# Patient Record
Sex: MALE | Race: WHITE
[De-identification: names, ages, dates, MRNs, and addresses within clinical notes are randomized per-mention and may not be internally consistent; named-entity substitution may affect disease eponyms.]

---

## 2017-11-03 ENCOUNTER — HOSPITAL ENCOUNTER (OUTPATIENT)
Dept: HOSPITAL 92 - CT | Age: 77
Discharge: HOME | End: 2017-11-03
Attending: INTERNAL MEDICINE
Payer: MEDICARE

## 2017-11-03 DIAGNOSIS — C18.0: ICD-10-CM

## 2017-11-03 DIAGNOSIS — C34.90: Primary | ICD-10-CM

## 2017-11-03 PROCEDURE — 71260 CT THORAX DX C+: CPT

## 2017-11-03 PROCEDURE — 74177 CT ABD & PELVIS W/CONTRAST: CPT

## 2017-11-03 NOTE — CT
CT THORAX WITH IV CONTRAST:

 

CT ABDOMEN AND PELVIS WITH IV CONTRAST:

 

11/03/2017

 

HISTORY:

Follow up lung cancer as well as colon cancer.  CEA numbers have increased.  History of left lower l
milo removal, as well as colon resection.

 

COMPARISON:

PET CT on 02/18/2017 and CT thorax on 02/03/2017 and CT abdomen and pelvis on 01/30/2017.

 

FINDINGS:

THORAX:  Post surgical changes related to median sternotomy and CABG are again noted.  Post surgical
 changes related to a left lower lobectomy are noted with surgical changes in the left hilar region,
 with post surgical changes involving the left sided ribs.

 

Previously noted left lower lobe mass is not seen related to left lobectomy.  There has been interva
l development of multiple bilateral pulmonary nodules, with the largest pulmonary nodule in the supe
rior segment of the right lower lobe, measuring 9 mm.  The largest pulmonary nodule seen in the left
 lower lobe measures 11 mm.

 

There has been interval development of an enlarged subcarinal lymph node, which measures 5.1 cm cran
iocaudal x 2.4 cm transverse x 1.4 cm AP.  No additional enlarged mediastinal, axillary, or hilar ly
mph nodes are seen.

 

There are mild emphysematous changes in the upper lobes, greater on the right.

 

ABDOMEN AND PELVIS:  Again noted is cholelithiasis.

 

Subcentimeter, [id number], hypodense lesion is seen in the right hepatic lobe, stable
 from the prior exam.  Low density area seen within the more posterior aspect of the medial segment,
 left hepatic lobe, which is probably volume averaging, although a subtle subcentimeter hypodense le
isaac cannot be entirely excluded.

 

There is a lobulated appearance of each kidney, but the kidneys otherwise have a normal and stable a
ppearance from prior exam.

 

The spleen, pancreas, bilateral adrenal glands, opacified bowel, and urinary bladder demonstrate a n
ormal CT appearance.

 

Again noted is evidence of post colon resection.  Anastomosis is seen in the region of the rectosigm
oid junction with small bowel colonic anastomosis in the right upper quadrant.

 

There is mild focal aneurysmal dilatation of the inferior abdominal aorta, measuring 3.1 cm, with de
nse vascular calcifications present.

 

The prostate gland remains enlarged and heterogeneous in appearance.

 

There are fat containing bilateral inguinal hernias again identified.

 

Distal to the anastomotic suture line, within the rectosigmoid region, there is suggested eccentric 
thickening at the left aspect of the rectum.  A developing neoplastic process in this region cannot 
be entirely excluded, given asymmetry, compared to the remainder of the wall of the remaining colon.
  A few scattered colonic diverticula are seen.

 

IMPRESSION:

1.  Mild eccentric thickening involving the left aspect of the rectum, below the anastomotic suture 
line.  A developing neoplastic process in this region cannot be excluded.  Direct visualization is s
uggested.

 

2.  Metastatic disease with interval development of multiple bilateral pulmonary nodules, as well as
 an enlarged subcarinal lymph node.

 

3.  Post surgical changes related to left lower lobectomy, as well as post surgical changes related 
to coronary artery bypass grafting and partial colon resection.

 

4.  Additional incidental findings are as described above.

 

POS: [facility]

## 2017-11-21 ENCOUNTER — HOSPITAL ENCOUNTER (OUTPATIENT)
Dept: HOSPITAL 92 - SDC | Age: 77
End: 2017-11-21
Attending: THORACIC SURGERY (CARDIOTHORACIC VASCULAR SURGERY)
Payer: MEDICARE

## 2017-11-21 VITALS — BODY MASS INDEX: 29.1 KG/M2

## 2017-11-21 DIAGNOSIS — Z87.891: ICD-10-CM

## 2017-11-21 DIAGNOSIS — I10: ICD-10-CM

## 2017-11-21 DIAGNOSIS — J44.9: ICD-10-CM

## 2017-11-21 DIAGNOSIS — Z98.890: ICD-10-CM

## 2017-11-21 DIAGNOSIS — Z90.2: ICD-10-CM

## 2017-11-21 DIAGNOSIS — C38.3: Primary | ICD-10-CM

## 2017-11-21 DIAGNOSIS — E78.5: ICD-10-CM

## 2017-11-21 DIAGNOSIS — Z79.84: ICD-10-CM

## 2017-11-21 DIAGNOSIS — Z90.49: ICD-10-CM

## 2017-11-21 DIAGNOSIS — Z79.82: ICD-10-CM

## 2017-11-21 DIAGNOSIS — Z95.1: ICD-10-CM

## 2017-11-21 DIAGNOSIS — Z91.041: ICD-10-CM

## 2017-11-21 DIAGNOSIS — E11.9: ICD-10-CM

## 2017-11-21 DIAGNOSIS — Z79.899: ICD-10-CM

## 2017-11-21 DIAGNOSIS — Z87.01: ICD-10-CM

## 2017-11-21 LAB
ANION GAP SERPL CALC-SCNC: 13 MMOL/L (ref 10–20)
BASOPHILS # BLD AUTO: 0.1 THOU/UL (ref 0–0.2)
BASOPHILS NFR BLD AUTO: 1.1 % (ref 0–1)
BUN SERPL-MCNC: 17 MG/DL (ref 8.4–25.7)
CALCIUM SERPL-MCNC: 9.1 MG/DL (ref 7.8–10.44)
CHLORIDE SERPL-SCNC: 103 MMOL/L (ref 98–107)
CO2 SERPL-SCNC: 21 MMOL/L (ref 23–31)
CREAT CL PREDICTED SERPL C-G-VRATE: 67 ML/MIN (ref 70–130)
EOSINOPHIL # BLD AUTO: 0.4 THOU/UL (ref 0–0.7)
EOSINOPHIL NFR BLD AUTO: 5.5 % (ref 0–10)
HCT VFR BLD CALC: 41.1 % (ref 42–52)
LYMPHOCYTES # BLD: 1.2 THOU/UL (ref 1.2–3.4)
LYMPHOCYTES NFR BLD AUTO: 18 % (ref 21–51)
MONOCYTES # BLD AUTO: 0.9 THOU/UL (ref 0.11–0.59)
MONOCYTES NFR BLD AUTO: 13.3 % (ref 0–10)
NEUTROPHILS # BLD AUTO: 4.2 THOU/UL (ref 1.4–6.5)
RBC # BLD AUTO: 4.14 MILL/UL (ref 4.7–6.1)
WBC # BLD AUTO: 6.7 THOU/UL (ref 4.8–10.8)

## 2017-11-21 PROCEDURE — 80048 BASIC METABOLIC PNL TOTAL CA: CPT

## 2017-11-21 PROCEDURE — 36415 COLL VENOUS BLD VENIPUNCTURE: CPT

## 2017-11-21 PROCEDURE — 85025 COMPLETE CBC W/AUTO DIFF WBC: CPT

## 2017-11-21 PROCEDURE — 88307 TISSUE EXAM BY PATHOLOGIST: CPT

## 2017-11-21 PROCEDURE — 93005 ELECTROCARDIOGRAM TRACING: CPT

## 2017-11-21 PROCEDURE — 0WBC4ZX EXCISION OF MEDIASTINUM, PERCUTANEOUS ENDOSCOPIC APPROACH, DIAGNOSTIC: ICD-10-PCS | Performed by: THORACIC SURGERY (CARDIOTHORACIC VASCULAR SURGERY)

## 2017-11-21 PROCEDURE — 93010 ELECTROCARDIOGRAM REPORT: CPT

## 2017-11-21 PROCEDURE — 88331 PATH CONSLTJ SURG 1 BLK 1SPC: CPT

## 2017-11-21 NOTE — OP
PREOPERATIVE DIAGNOSIS:  Subcarinal mediastinal adenopathy.

 

POSTOPERATIVE DIAGNOSIS:  Subcarinal mediastinal adenopathy.

 

PROCEDURE:  Cervical mediastinal exploration with biopsies.

 

SURGEON:  Jaspal Reyes M.D.

 

ANESTHESIA:  General.

 

ESTIMATED BLOOD LOSS:  Less than 5 mL.

 

PROCEDURE IN DETAIL:  After adequate anesthesia had been obtained, the patient had a right shoulder r
oll placed and the head was left on a pillow.  He was prepped and draped.  Suprasternal notch incisio
n was then made and carried down through the midline bluntly and sharply to the level of the trachea 
where blunt finger dissection into the mediastinum was carried out.  Mediastinoscope was then inserte
d following which the dissection was carried down to the maddi.  After identifying right and left ma
in stem bronchi, lymph node was identified that was firm.  It was mobilized to some degree with a doc
nt dissection and then after aspirating for blood.  Multiple biopsies were obtained.  Frozen section 
returned to carcinoma.  Hemostasis was obtained and after checking this, the scope was removed and th
e wound was closed in layers.

## 2017-11-26 NOTE — EKG
Test Reason : PREOP

Blood Pressure : ***/*** mmHG

Vent. Rate : 069 BPM     Atrial Rate : 069 BPM

   P-R Int : 184 ms          QRS Dur : 132 ms

    QT Int : 418 ms       P-R-T Axes : 041 -37 112 degrees

   QTc Int : 447 ms

 

Normal sinus rhythm

Left axis deviation

Non-specific intra-ventricular conduction block

T wave abnormality, consider anterolateral ischemia

Abnormal ECG

When compared with ECG of 08-MAR-2017 17:11,

QRS axis Shifted left

Confirmed by MAKAYLA ALLEN ([id number]) on 11/26/2017 8:37:25 AM

 

Referred By:  JOSEMANUEL           Confirmed By:MAKAYLA ALLEN

## 2018-01-08 ENCOUNTER — HOSPITAL ENCOUNTER (EMERGENCY)
Dept: HOSPITAL 92 - SCSER | Age: 78
Discharge: HOME | End: 2018-01-08
Payer: MEDICARE

## 2018-01-08 DIAGNOSIS — E11.9: ICD-10-CM

## 2018-01-08 DIAGNOSIS — Z87.891: ICD-10-CM

## 2018-01-08 DIAGNOSIS — I10: ICD-10-CM

## 2018-01-08 DIAGNOSIS — E87.1: ICD-10-CM

## 2018-01-08 DIAGNOSIS — J44.9: ICD-10-CM

## 2018-01-08 DIAGNOSIS — E78.5: ICD-10-CM

## 2018-01-08 DIAGNOSIS — C34.90: Primary | ICD-10-CM

## 2018-01-08 PROCEDURE — 94640 AIRWAY INHALATION TREATMENT: CPT

## 2018-01-08 PROCEDURE — 93005 ELECTROCARDIOGRAM TRACING: CPT

## 2018-01-08 PROCEDURE — 71046 X-RAY EXAM CHEST 2 VIEWS: CPT

## 2018-01-08 NOTE — RAD
CHEST TWO VIEWS:

 

History: 

77-year-old male with shortness of breath, difficulty breathing. History of COPD. History of colon ca
rcinoma with metastases to both lungs. 

 

Comparison: 

Chest CT, 11-3-17. 

 

FINDINGS: 

Post underlying sternotomy. Numerous bilateral pulmonary metastases have increased in size and number
 from the prior CT of 11-3-17. Bilateral pleural effusions, slightly greater on the left side, have d
eveloped. Mild cardiomegaly. Mild bilateral vascular congestion. 

 

IMPRESSION: 

Increase in size and number of multiple bilateral pulmonary metastases. Developing bilateral pleural 
effusions. Developing bilateral vascular congestion with minimal cardiomegaly. 

 

POS: [facility]

## 2018-01-20 ENCOUNTER — HOSPITAL ENCOUNTER (INPATIENT)
Dept: HOSPITAL 92 - SCSER | Age: 78
LOS: 2 days | Discharge: HOME | DRG: 189 | End: 2018-01-22
Attending: INTERNAL MEDICINE | Admitting: INTERNAL MEDICINE
Payer: MEDICARE

## 2018-01-20 VITALS — BODY MASS INDEX: 28.3 KG/M2

## 2018-01-20 DIAGNOSIS — Z79.82: ICD-10-CM

## 2018-01-20 DIAGNOSIS — J98.11: ICD-10-CM

## 2018-01-20 DIAGNOSIS — Z90.49: ICD-10-CM

## 2018-01-20 DIAGNOSIS — J90: ICD-10-CM

## 2018-01-20 DIAGNOSIS — C34.91: ICD-10-CM

## 2018-01-20 DIAGNOSIS — C78.1: ICD-10-CM

## 2018-01-20 DIAGNOSIS — E87.1: ICD-10-CM

## 2018-01-20 DIAGNOSIS — Z91.09: ICD-10-CM

## 2018-01-20 DIAGNOSIS — Z99.81: ICD-10-CM

## 2018-01-20 DIAGNOSIS — I12.9: ICD-10-CM

## 2018-01-20 DIAGNOSIS — I25.2: ICD-10-CM

## 2018-01-20 DIAGNOSIS — D64.9: ICD-10-CM

## 2018-01-20 DIAGNOSIS — C77.8: ICD-10-CM

## 2018-01-20 DIAGNOSIS — C34.92: ICD-10-CM

## 2018-01-20 DIAGNOSIS — E78.5: ICD-10-CM

## 2018-01-20 DIAGNOSIS — E11.9: ICD-10-CM

## 2018-01-20 DIAGNOSIS — I25.10: ICD-10-CM

## 2018-01-20 DIAGNOSIS — Z85.038: ICD-10-CM

## 2018-01-20 DIAGNOSIS — J44.9: ICD-10-CM

## 2018-01-20 DIAGNOSIS — J96.21: Primary | ICD-10-CM

## 2018-01-20 DIAGNOSIS — N18.2: ICD-10-CM

## 2018-01-20 DIAGNOSIS — Z95.1: ICD-10-CM

## 2018-01-20 LAB
ALBUMIN SERPL BCG-MCNC: 4 G/DL (ref 3.4–4.8)
ALP SERPL-CCNC: 63 U/L (ref 40–150)
ALT SERPL W P-5'-P-CCNC: 22 U/L (ref 8–55)
ANION GAP SERPL CALC-SCNC: 15 MMOL/L (ref 10–20)
AST SERPL-CCNC: 14 U/L (ref 5–34)
BASOPHILS # BLD AUTO: 0.1 THOU/UL (ref 0–0.2)
BASOPHILS NFR BLD AUTO: 1.5 % (ref 0–1)
BILIRUB SERPL-MCNC: 0.3 MG/DL (ref 0.2–1.2)
BUN SERPL-MCNC: 14 MG/DL (ref 8.4–25.7)
CALCIUM SERPL-MCNC: 9.5 MG/DL (ref 7.8–10.44)
CHLORIDE SERPL-SCNC: 94 MMOL/L (ref 98–107)
CK MB SERPL-MCNC: 1.3 NG/ML (ref 0–6.6)
CK SERPL-CCNC: 52 U/L (ref 30–200)
CO2 SERPL-SCNC: 26 MMOL/L (ref 23–31)
CREAT CL PREDICTED SERPL C-G-VRATE: 0 ML/MIN (ref 70–130)
EOSINOPHIL # BLD AUTO: 0.6 THOU/UL (ref 0–0.7)
EOSINOPHIL NFR BLD AUTO: 6.4 % (ref 0–10)
GLOBULIN SER CALC-MCNC: 2.6 G/DL (ref 2.4–3.5)
GLUCOSE SERPL-MCNC: 169 MG/DL (ref 83–110)
HGB BLD-MCNC: 14.9 G/DL (ref 14–18)
LYMPHOCYTES # BLD: 1 THOU/UL (ref 1.2–3.4)
LYMPHOCYTES NFR BLD AUTO: 11.3 % (ref 21–51)
MCH RBC QN AUTO: 31.5 PG (ref 27–31)
MCV RBC AUTO: 94.4 FL (ref 80–94)
MONOCYTES # BLD AUTO: 1.1 THOU/UL (ref 0.11–0.59)
MONOCYTES NFR BLD AUTO: 11.9 % (ref 0–10)
NEUTROPHILS # BLD AUTO: 6.2 THOU/UL (ref 1.4–6.5)
NEUTROPHILS NFR BLD AUTO: 68.8 % (ref 42–75)
PLATELET # BLD AUTO: 310 THOU/UL (ref 130–400)
POTASSIUM SERPL-SCNC: 4.6 MMOL/L (ref 3.5–5.1)
RBC # BLD AUTO: 4.72 MILL/UL (ref 4.7–6.1)
SODIUM SERPL-SCNC: 130 MMOL/L (ref 136–145)
TROPONIN I SERPL DL<=0.01 NG/ML-MCNC: (no result) NG/ML (ref ?–0.03)
WBC # BLD AUTO: 9 THOU/UL (ref 4.8–10.8)

## 2018-01-20 PROCEDURE — 71045 X-RAY EXAM CHEST 1 VIEW: CPT

## 2018-01-20 PROCEDURE — 71275 CT ANGIOGRAPHY CHEST: CPT

## 2018-01-20 PROCEDURE — 96374 THER/PROPH/DIAG INJ IV PUSH: CPT

## 2018-01-20 PROCEDURE — 82553 CREATINE MB FRACTION: CPT

## 2018-01-20 PROCEDURE — 80069 RENAL FUNCTION PANEL: CPT

## 2018-01-20 PROCEDURE — 80053 COMPREHEN METABOLIC PANEL: CPT

## 2018-01-20 PROCEDURE — 83735 ASSAY OF MAGNESIUM: CPT

## 2018-01-20 PROCEDURE — 94640 AIRWAY INHALATION TREATMENT: CPT

## 2018-01-20 PROCEDURE — 93005 ELECTROCARDIOGRAM TRACING: CPT

## 2018-01-20 PROCEDURE — 36416 COLLJ CAPILLARY BLOOD SPEC: CPT

## 2018-01-20 PROCEDURE — 85025 COMPLETE CBC W/AUTO DIFF WBC: CPT

## 2018-01-20 PROCEDURE — A4216 STERILE WATER/SALINE, 10 ML: HCPCS

## 2018-01-20 PROCEDURE — 84484 ASSAY OF TROPONIN QUANT: CPT

## 2018-01-20 PROCEDURE — 36415 COLL VENOUS BLD VENIPUNCTURE: CPT

## 2018-01-20 NOTE — RAD
PORTABLE CHEST:

1/20/18

 

HISTORY: 

Shortness of breath. 

 

Exam is suboptimal due to poor positioning and motion artifact. 

 

COMPARISON:  

Comparison made to chest film of 1/8/18.

 

There is almost complete opacification of the left hemithorax seen on the current study which is a ne
w finding when compared to the prior exam. There is cardiomegaly with vascular congestion and probabl
y some interstitial edema in the right lung. Postop sternotomy change.

 

IMPRESSION:  

Limited exam due to poor positioning and motion artifact. There is now new opacification in the left 
hemithorax when compared to the prior study. There are numerous nodular densities in the right lung w
hich were described previously as probable metastatic lesions. Congestive changes are again noted. 

 

POS: [facility]

## 2018-01-21 LAB
ALBUMIN SERPL BCG-MCNC: 3.7 G/DL (ref 3.4–4.8)
ANION GAP SERPL CALC-SCNC: 15 MMOL/L (ref 10–20)
BASOPHILS # BLD AUTO: 0.1 THOU/UL (ref 0–0.2)
BASOPHILS NFR BLD AUTO: 0.6 % (ref 0–1)
BUN SERPL-MCNC: 12 MG/DL (ref 8.4–25.7)
BUN/CREAT SERPL: 13.79
CALCIUM SERPL-MCNC: 8.9 MG/DL (ref 7.8–10.44)
CHLORIDE SERPL-SCNC: 96 MMOL/L (ref 98–107)
CO2 SERPL-SCNC: 23 MMOL/L (ref 23–31)
CREAT CL PREDICTED SERPL C-G-VRATE: 81 ML/MIN (ref 70–130)
EOSINOPHIL # BLD AUTO: 0.6 THOU/UL (ref 0–0.7)
EOSINOPHIL NFR BLD AUTO: 5.8 % (ref 0–10)
GLUCOSE SERPL-MCNC: 156 MG/DL (ref 83–110)
HGB BLD-MCNC: 13.9 G/DL (ref 14–18)
LYMPHOCYTES # BLD: 0.9 THOU/UL (ref 1.2–3.4)
LYMPHOCYTES NFR BLD AUTO: 9.3 % (ref 21–51)
MAGNESIUM SERPL-MCNC: 1.8 MG/DL (ref 1.6–2.6)
MCH RBC QN AUTO: 32.7 PG (ref 27–31)
MCV RBC AUTO: 98.7 FL (ref 80–94)
MONOCYTES # BLD AUTO: 1.2 THOU/UL (ref 0.11–0.59)
MONOCYTES NFR BLD AUTO: 11.6 % (ref 0–10)
NEUTROPHILS # BLD AUTO: 7.4 THOU/UL (ref 1.4–6.5)
NEUTROPHILS NFR BLD AUTO: 72.7 % (ref 42–75)
PLATELET # BLD AUTO: 323 THOU/UL (ref 130–400)
POTASSIUM SERPL-SCNC: 3.7 MMOL/L (ref 3.5–5.1)
RBC # BLD AUTO: 4.26 MILL/UL (ref 4.7–6.1)
SODIUM SERPL-SCNC: 130 MMOL/L (ref 136–145)
TROPONIN I SERPL DL<=0.01 NG/ML-MCNC: (no result) NG/ML (ref ?–0.03)
TROPONIN I SERPL DL<=0.01 NG/ML-MCNC: 0.01 NG/ML (ref ?–0.03)
WBC # BLD AUTO: 10.1 THOU/UL (ref 4.8–10.8)

## 2018-01-21 RX ADMIN — THERA TABS SCH TAB: TAB at 08:36

## 2018-01-21 RX ADMIN — INSULIN HUMAN PRN UNIT: 100 INJECTION, SOLUTION PARENTERAL at 06:17

## 2018-01-21 NOTE — HP
DATE OF ADMISSION:  01/20/2018

 

The patient was seen and examined on 01/21/2018 around 01:00 a.m.  The patient got admitted from [facility] ER.

 

CODE STATUS:  FULL CODE, confirmed with the patient.

 

SURROGATE DECISION-MAKER:  Patient makes his own decisions with the help of his wife.

 

CHIEF COMPLAINT:  Shortness of breath of 2 weeks' duration.

 

HISTORY OF PRESENT ILLNESS:  The patient is a 77-year-old male with adenocarcinoma of the lung; coron
alisha artery disease; diabetes mellitus, type 2; COPD with chronic respiratory failure, on home oxygen;
 presented to the emergency room at [place] with above complaints.

 

The patient was seen in the emergency room on 01/18/2018 for similar complaint.  A chest x-ray at [facility]
t time showed multiple bilateral pulmonary metastases with developing bilateral pleural effusion.  He
 was discharged home at that time.

 

The patient returned to the emergency room today due to worsening shortness of breath that got worse 
over the last 2-3 days to the extent that he was short of breath at rest.  He is normally on 3 liters
 oxygen.  He tried increasing the oxygen to 5 liters without significant help.  He also uses nebulize
r treatment every 4 hours.  He had some cough, which was essentially nonproductive.  The shortness of
 breath was also worse on lying down.  He was placed on nonrebreather in the emergency room.  His O2 
saturation in the ER was 76% on 4 liter nasal cannula.  Chest x-ray done in the emergency room today 
was consistent with new opacification of the left hemithorax compared to the study from 12 days ago. 
 He received Lasix 40 mg along with DuoNeb in the emergency room.

 

PAST MEDICAL HISTORY:

1.  Non-small cell lung cancer.

2.  Chronic obstructive pulmonary disease.

3.  Chronic respiratory failure, on home oxygen 3-3.5 liters.

4.  Hypertension.

5.  Diabetes mellitus, type 2.

6.  Colon cancer, status post colectomy.

7.  Hypertension.

8.  Dyslipidemia.

 

PAST SURGICAL HISTORY:

1.  CABG in 1993 with redo CABG in 2003.

2.  Appendectomy.

3.  Herniography.

4.  Cardiac catheterization.

5.  Left inguinal hernia repair.

6.  Laparoscopic partial colectomy in 01/2017.

7.  Left lower lobectomy in 03/2017..

8.  Mediastinal exploration with biopsy in 11/2017.

 

ALLERGIES:  The patient is allergic to IODINE.

 

CURRENT HOME MEDICATIONS:  Amlodipine 2.5 mg daily, aspirin 81 mg daily, carvedilol 12.5 mg b.i.d., Z
yrtec 10 mg daily, vitamin D3 2000 units daily, glucosamine 1 capsule daily, lisinopril 5 mg at bedti
me, metformin 500 mg b.i.d., multivitamin 1 tablet daily, Actos 30 mg daily, simvastatin 10 mg at bed
time, Incruse Ellipta 1 inhalation daily.

 

SOCIAL HISTORY:  Patient currently lives at home, is a former smoker.  No alcohol or drug use.

 

FAMILY HISTORY:  Negative for premature coronary artery disease.

 

REVIEW OF SYSTEMS:  The following complete review of systems was negative, unless otherwise mentioned
 in the HPI or below:  Constitutional:  Weight loss or gain, ability to conduct usual activities.  Sk
in:  Rash, itching.  Eyes:  Double vision, pain.  ENT/Mouth:  Nose bleeding, neck stiffness, pain, te
nderness.  Cardiovascular:  Palpitations, dyspnea on exertion, orthopnea.  Respiratory:  Shortness of
 breath, wheezing, cough, hemoptysis, fever, or night sweats.  Gastrointestinal:  Poor appetite, abdo
jose pain, heartburn, nausea, vomiting, constipation, or diarrhea.  Genitourinary:  Urgency, frequen
cy, dysuria, nocturia.  Musculoskeletal:  Pain, swelling.  Neurologic/Psychiatric:  Anxiety, depressi
on.  Allergy/Immunologic:  Skin rash, bleeding tendency.

 

PHYSICAL EXAMINATION:

VITAL SIGNS:  As discussed above.  His temperature was 97.8, respirations 24, pulse rate of 80 with a
 blood pressure 157/84.

GENERAL:  A 77-year-old male in mild respiratory distress, able to complete short sentences.  Overall
, feels better after ER treatment.

HEENT:  Head atraumatic, normocephalic.  Sclerae are anicteric.  Moist mucous membranes.  No oral les
ion.

NECK:  Supple, no JVD appreciated.  No carotid bruit.

LUNGS:  Showed decreased breath sounds on the left with scattered rhonchi mainly on the right.  No si
gnificant wheezing noted.  There was decreased chest movement on the left.  They were done notes on p
ercussion on the left.

HEART:  S1, S2 present.  Regular rate and rhythm.  Healed midline scar from previous CABG.  No heaves
 or pulsation.

ABDOMEN:  Soft, nontender, bowel sounds present.

EXTREMITIES:  No edema or calf tenderness.

NEUROLOGIC:  Grossly nonfocal, moves all 4 extremities.

PSYCHIATRY:  Alert, awake, oriented x3.

SKIN:  Warm and dry.

LYMPH NODES:  No palpable lymph nodes in the neck.

PERIPHERAL VASCULAR:  Radial pulses palpable bilaterally.

MUSCULOSKELETAL:  No joint swelling or tenderness.

 

LABORATORY FINDINGS:  CBC showed WBC 9.0 with hemoglobin 14.9, hematocrit 44.5, platelet 310.  Chemis
tries showed sodium 130, potassium 4.6, chloride 94, bicarbonate 26, BUN 14, creatinine 1.12, glucose
 of 169.  Chest x-ray, by my review, as discussed above.  EKG, by my review, showed sinus rhythm with
 left axis deviation with some nonspecific ST-T wave changes.

 

IMPRESSION AND PLAN:

1.  Acute on chronic hypoxic respiratory failure secondary to left-sided pleural effusion.

2.  History of lung adenocarcinoma, followed by Dr. Power.

3.  Coronary artery disease, status post myocardial infarction and coronary artery bypass grafting in
 the past.

4.  Hypertension.

5.  Diabetes mellitus, type 2.

6.  Chronic obstructive pulmonary disease.

7.  Chronic respiratory failure, on 3-3.5 liters oxygen.

8.  Chronic anemia.

9.  History of colon cancer, status post resection.

10.  Hyponatremia.

11.  Chronic kidney disease, stage 2.

 

PLAN:  The patient will be monitored in the intermediate care unit.  We will continue nonrebreather f
or now.  The patient may need intermittent noninvasive positive pressure ventilation.  We will consul
t Dr. Desai in a.m.  He will probably get thoracentesis.  Pleural effusion appears to be malignant.  
We will hold aspirin for now.  Insulin sliding scale will be started.  Selected home medications will
 be resumed.  We will hold diuretics for now.  He received a dose of Lasix in the emergency room.  Co
ntinue carvedilol.  Plan of care was discussed with the patient.  He stated understanding.  The patie
nt will require 2-3 days for stabilization.

## 2018-01-22 VITALS — DIASTOLIC BLOOD PRESSURE: 81 MMHG | SYSTOLIC BLOOD PRESSURE: 133 MMHG | TEMPERATURE: 97.2 F

## 2018-01-22 LAB
ALBUMIN SERPL BCG-MCNC: 4.2 G/DL (ref 3.4–4.8)
ANION GAP SERPL CALC-SCNC: 15 MMOL/L (ref 10–20)
BASOPHILS # BLD AUTO: 0.1 THOU/UL (ref 0–0.2)
BASOPHILS NFR BLD AUTO: 0.6 % (ref 0–1)
BUN SERPL-MCNC: 14 MG/DL (ref 8.4–25.7)
BUN/CREAT SERPL: 16.87
CALCIUM SERPL-MCNC: 9.7 MG/DL (ref 7.8–10.44)
CHLORIDE SERPL-SCNC: 96 MMOL/L (ref 98–107)
CO2 SERPL-SCNC: 23 MMOL/L (ref 23–31)
CREAT CL PREDICTED SERPL C-G-VRATE: 81 ML/MIN (ref 70–130)
EOSINOPHIL # BLD AUTO: 0 THOU/UL (ref 0–0.7)
EOSINOPHIL NFR BLD AUTO: 0.4 % (ref 0–10)
GLUCOSE SERPL-MCNC: 181 MG/DL (ref 83–110)
HGB BLD-MCNC: 14.9 G/DL (ref 14–18)
LYMPHOCYTES # BLD: 0.6 THOU/UL (ref 1.2–3.4)
LYMPHOCYTES NFR BLD AUTO: 7 % (ref 21–51)
MCH RBC QN AUTO: 33.8 PG (ref 27–31)
MCV RBC AUTO: 99.9 FL (ref 80–94)
MONOCYTES # BLD AUTO: 0.2 THOU/UL (ref 0.11–0.59)
MONOCYTES NFR BLD AUTO: 2 % (ref 0–10)
NEUTROPHILS # BLD AUTO: 7.4 THOU/UL (ref 1.4–6.5)
NEUTROPHILS NFR BLD AUTO: 89.9 % (ref 42–75)
PLATELET # BLD AUTO: 343 THOU/UL (ref 130–400)
POTASSIUM SERPL-SCNC: 4.4 MMOL/L (ref 3.5–5.1)
RBC # BLD AUTO: 4.4 MILL/UL (ref 4.7–6.1)
SODIUM SERPL-SCNC: 130 MMOL/L (ref 136–145)
WBC # BLD AUTO: 8.2 THOU/UL (ref 4.8–10.8)

## 2018-01-22 RX ADMIN — THERA TABS SCH TAB: TAB at 07:58

## 2018-01-22 RX ADMIN — INSULIN HUMAN PRN UNIT: 100 INJECTION, SOLUTION PARENTERAL at 06:31

## 2018-01-22 RX ADMIN — INSULIN HUMAN PRN UNIT: 100 INJECTION, SOLUTION PARENTERAL at 10:58

## 2018-01-22 NOTE — CON
DATE OF CONSULTATION:  01/21/2018

 

SUBJECTIVE:  Mr. Santana is a pleasant 77-year-old male.

 

I met with the wife and asked to get history from her.  He was recently in the hospital for evaluatio
n in the emergency department with complaints of shortness of breath.  He was sent home and has had p
rogressive decline in his dyspnea.

 

He has been seen by me in the past after a lung mass was found during workup for colon cancer.  Lung 
mass was resected after colon cancer was resected.

 

He subsequently presented with mediastinal metastasis and worked up with mediastinal.

 

It was felt to have multiple pulmonary nodules in both lungs that were all malignant.

 

Last chest radiograph showed this.  He presented this admission with dyspnea and hypoxemia.  Chest ra
diograph shows what appears to be atelectasis of the left lung, although it is difficult to say wheth
er this is atelectasis or an effusion.  His trachea is pulled to the left, I am guessing it is atelec
tasis.

 

It would be very unusual for a massive effusion reaccumulate in less than 2 weeks.

 

PAST MEDICAL HISTORY:  Remarkable for hypertension, diabetes, lipid disorder, coronary artery bypass 
grafting in 1993 and 2003, appendectomy, herniorrhaphy, colectomy in 01/17, left lower lobectomy in 0
3/2017, this was a T2 M0 tumor, this is a a 6 cm tumor, but was noted negative.  On 11/2017, he had a
 CME.

 

He reports allergy to IODINE.

 

He was on amlodipine, aspirin, Coreg, Zyrtec, glucosamine, vitamin D, lisinopril, metformin, Actos, s
imvastatin, Incruse.

 

SOCIAL HISTORY:  He is a former smoker, does not drink, does not use drugs.

 

FAMILY HISTORY:  Negative for lung disease at an early age.

 

REVIEW OF SYSTEMS:  Remarkable only for shortness of breath on exertion and a cough.  He has had no f
ever, chills, sweats.  He denies hemoptysis.

 

PHYSICAL EXAMINATION:

VITAL SIGNS:  He is afebrile, heart rate is 92, blood pressure 129/81, respiratory rate teens to low 
20s, oximetry 100% on 3 liters.

HEENT:  Pupils are equal.  Sclerae is anicteric.

NECK:  Supple.

LUNGS:  Remarkable for distant breath sounds on the left.

HEART:  Regular rhythm.  S1 and S2 are normal.

ABDOMEN:  Soft and nontender.

EXTREMITIES:  Without asymmetry.

 

LABORATORY DATA:  White count 10.1, hemoglobin 13.9, platelets 323.  Sodium 130, potassium 3.7, chlor
cesar 96, bicarbonate 23, BUN 12, creatinine 0.87.

 

IMPRESSION:

1.  Metastatic lung cancer.

2.  T4M0 colon cancer, resected.

3.  ?Atelectasis of left lung.  CT imaging after he gets prophylaxis for an IODINE allergy, will be t
he next step, workup will be entertained.  He tells me there is no treatment plan.  He has been unabl
e to get approval for Keytruda.  I would be happy to see him while he is in the hospital and evaluate
 him for endobronchial obstruction.  I doubt this is a massive effusion.  If it is, he will need obvi
ously thoracentesis and perhaps even a pleural drainage catheter.

 

This is a 50 minute consult, greater than 50% of the time was spent on the unit coordinating care, re
viewing records from radiographs and labs.

## 2018-01-22 NOTE — PRG
DATE OF SERVICE:  01/22/2018

 

Chai Santana CT was reviewed.  By this morning his atelectasis of his left lung had resolved.  Mu
ltiple pulmonary nodules and small bilateral effusions were noted.

 

He says he feels 100% better.  He is stable for him to go home with nebulized ipratropium and albuter
ol 4 times a day, prednisone 40 mg for 4 days, 20 for 6 days, and 10 mg for 10 days, p.o. antibiotics
, Ceftin 250 mg twice a day, Restoril 15mg for sleep since he cannot sleep when he is on steroids.  

 

I will see him in the office in 1 week.

## 2018-01-22 NOTE — CT
PRELIMINARY REPORT/[facility]/EMERGENCY AFTER

HOURS PROCEDURE:

 

EXAM:

CT Angiography Chest With Intravenous Contrast

 

EXAM DATE/TIME:

Exam ordered 1/22/2018 5:04 AM

 

CLINICAL HISTORY:

77 years old, male; Signs and symptoms; Shortness of breath; Patient HX: SOB, R/O pe

 

TECHNIQUE:

Axial computed tomographic angiography images of the chest with intravenous contrast using pulmonary 
embolism protocol.

 

CONTRAST:

100 mL of ISOVUE administered intravenously.

 

COMPARISON:

No relevant prior studies available.

 

FINDINGS:

Pulmonary arteries: There is no evidence of peripheral filling defects within the pulmonary arterial 
circulation to suggest pulmonary embolism.

Aorta: The aorta is normal. There is no evidence of aortic dissection, leak, rupture, or other compli
cations.

Lungs: There are innumerable pulmonary nodules consistent with lung metastases. Moderate centrilobula
r emphysematous changes are present.

Pleural space: Normal. No significant effusion. No pneumothorax.

Heart: Normal. No cardiomegaly. No significant pericardial effusion. No evidence of RV dysfunction.

Thyroid: The thyroid gland is normal.

Bones/joints: No acute fracture. No dislocation.

Soft tissues: Normal.

Lymph nodes: Pathologically enlarged mediastinal lymph nodes are present. For example there is a 1.4 
cm RIGHT paratracheal lymph node. There is also a 1.7 x 3.2 cm subcarinal/anterior tracheal region po
ssibly representing enlarged lymph node or duplication cyst.

Gallbladder and bile ducts: A calcified gallstone is present.

 

IMPRESSION:

1. There is no CT evidence of acute pulmonary embolism.

2. There are innumerable pulmonary nodules consistent with lung metastases.

3. Mediastinal lymphadenopathy as above.

 

Thank you for allowing us to participate in the care of your patient.

 

Dictated and Authenticated by: Rahul Carmona MD

01/22/2018 5:42 AM Central Time ([place] & [place])

 

 

FINAL REPORT 

EMERGENCY AFTER HOURS CT ANGIO CHEST:

 

Date:  01/22/18 

 

FINDINGS: 

I agree with the preliminary report given by [facility]. No definite central or segmental pulmonary embolus
 is grossly evident. Motion artifact from breathing slightly limits image detail of the segmental pul
monary vasculature of both lower lobes as well as portions of the lingula and right middle lobe. Ther
e are numerous pulmonary nodules seen throughout both lungs that have increased in number from a comp
[facility] consistent with worsening metastatic disease. There is worsening ly
mphadenopathy within the mediastinum and hilar region suspicious for worsening, malignant lymphadenop
athy. There are small bilateral pleural effusions that have developed in the interim. Gallstone is se
en within the upper abdomen within the gallbladder lumen. 

 

IMPRESSION: 

1.  No evidence to suggest central pulmonary embolus. Some limitations to the CT examination of the t
horax as detailed above. 

 

2.  Worsening pulmonary metastatic disease and mediastinal lymphadenopathy. 

 

3.  Cholelithiasis. 

 

4.  New bilateral pleural effusions, left greater than right. 

 

 

POS: [facility]

## 2018-01-23 NOTE — DIS
DATE OF DISCHARGE:  01/22/2018

 

DISCHARGE DISPOSITION:  Home.

 

FOLLOWUP:  Follow up with primary care physician, Dr. Cali Conde in 1 week.  Follow up with Dr. Brian Desai after a week.

 

ALLERGIES:  The patient is allergic to IODINE. 

 

DISCHARGE MEDICATIONS:

1.  Prednisone as directed.

2.  Ceftin 250 mg b.i.d.

3.  DuoNeb as needed.

4.  Restoril as needed.

5.  Other home medications were resumed including amlodipine 2.5 mg daily, aspirin 81 mg daily, carve
dilol 12.5 mg b.i.d., Zyrtec 10 mg daily, vitamin D3 2000 units daily, glucosamine 1 tablet daily, li
sinopril 5 mg at bedtime, Metformin extended release 500 mg b.i.d., multivitamin 1 capsule daily, Act
os 30 mg daily, simvastatin 10 mg at bedtime, Incruse 62.5 mcg inhalation daily.

 

The patient was seen on the day of discharge, denies any new complaints, feels much better.  His oxyg
en saturation is 99% on 3 liter nasal cannula with a blood pressure of 133/81.

 

BRIEF HOSPITAL COURSE:  The patient is a 77-year-old male with adenocarcinoma of the lung, coronary a
rtery disease, diabetes mellitus type 2, COPD with chronic respiratory failure, on home oxygen, prese
nted to the hospital with shortness of breath of 2 weeks' duration.  His chest x-ray on admission sheri
wed left-sided pleural effusion.  Please refer to the history and physical dated 01/18/2018 for furth
er details.

 

The patient was admitted to the Intermediate Care Unit with a diagnosis of respiratory failure.  He w
as started on nebulizer treatment.  The next day the patient was evaluated by Pulmonary, Dr. Desai.  
He was started on steroids with antibiotics.  A CT scan of the chest was done that showed worsening o
f pulmonary metastatic disease with mediastinal lymphadenopathy with new bilateral pleural effusions,
 left greater than right.  His symptoms have significantly improved.  Dr. Desai has cleared the patie
nt for discharge.  He will follow up with Dr. Desai next week.

 

FINAL DIAGNOSES:

1.  Acute on chronic hypoxic respiratory failure secondary to left-sided pleural effusion, improved. 
 There was no need for thoracentesis per Pulmonary.

2.  History of lung adenocarcinoma followed by Dr. Power.

3.  Coronary artery disease, status post myocardial infarction and coronary artery bypass graft in th
e past.

4.  Hypertension.

5.  Diabetes mellitus type 2.

6.  Chronic obstructive pulmonary disease.

7.  Chronic respiratory failure on 3 to 3-1/2 liters of oxygen.

8.  Chronic anemia.

9.  History of colon cancer, status post resection.

10.  Hyponatremia.

11.  Chronic kidney disease stage 2.

 

Plan of care was discussed with the patient in detail.  He stated understanding.

## 2018-01-30 ENCOUNTER — HOSPITAL ENCOUNTER (OUTPATIENT)
Dept: HOSPITAL 92 - RAD | Age: 78
Discharge: HOME | End: 2018-01-30
Attending: INTERNAL MEDICINE
Payer: MEDICARE

## 2018-01-30 DIAGNOSIS — J90: ICD-10-CM

## 2018-01-30 DIAGNOSIS — R06.00: Primary | ICD-10-CM

## 2018-01-30 DIAGNOSIS — R91.8: ICD-10-CM

## 2018-01-30 PROCEDURE — 71046 X-RAY EXAM CHEST 2 VIEWS: CPT

## 2018-01-30 NOTE — RAD
CHEST TWO VIEWS:

 

History: Dyspnea. 

 

Comparison: 1-20-18

 

FINDINGS: 

Cardiac silhouette is enlarged. Pulmonary vasculature remains slightly engorged. Left pleural fluid h
as decreased significantly since the prior study with re-aeration of the left lung. Mediastinum is mi
dline with aortic calcification and post-operative changes. 

 

Patchy areas of infiltrate throughout the right lung have increased slightly. There is a small amount
 of right pleural fluid. 

 

IMPRESSION: 

1. Significant internal decrease left pleural fluid with re-expansion of the left lung. 

2. Patchy infiltrate throughout the right lung has progressed slightly. Small right pleural effusion.


 

[place] [facility]

## 2018-02-07 ENCOUNTER — HOSPITAL ENCOUNTER (INPATIENT)
Dept: HOSPITAL 92 - SCSER | Age: 78
LOS: 7 days | Discharge: HOME | DRG: 180 | End: 2018-02-14
Attending: INTERNAL MEDICINE | Admitting: INTERNAL MEDICINE
Payer: MEDICARE

## 2018-02-07 VITALS — BODY MASS INDEX: 29.8 KG/M2

## 2018-02-07 DIAGNOSIS — I48.0: ICD-10-CM

## 2018-02-07 DIAGNOSIS — Z87.891: ICD-10-CM

## 2018-02-07 DIAGNOSIS — C34.32: Primary | ICD-10-CM

## 2018-02-07 DIAGNOSIS — Z79.899: ICD-10-CM

## 2018-02-07 DIAGNOSIS — E11.22: ICD-10-CM

## 2018-02-07 DIAGNOSIS — Z90.2: ICD-10-CM

## 2018-02-07 DIAGNOSIS — Z95.1: ICD-10-CM

## 2018-02-07 DIAGNOSIS — J96.21: ICD-10-CM

## 2018-02-07 DIAGNOSIS — Z79.82: ICD-10-CM

## 2018-02-07 DIAGNOSIS — N18.3: ICD-10-CM

## 2018-02-07 DIAGNOSIS — J44.9: ICD-10-CM

## 2018-02-07 DIAGNOSIS — Z99.81: ICD-10-CM

## 2018-02-07 DIAGNOSIS — I25.2: ICD-10-CM

## 2018-02-07 DIAGNOSIS — E87.70: ICD-10-CM

## 2018-02-07 DIAGNOSIS — J91.0: ICD-10-CM

## 2018-02-07 DIAGNOSIS — E87.1: ICD-10-CM

## 2018-02-07 DIAGNOSIS — I12.9: ICD-10-CM

## 2018-02-07 DIAGNOSIS — Z85.038: ICD-10-CM

## 2018-02-07 DIAGNOSIS — Z90.49: ICD-10-CM

## 2018-02-07 DIAGNOSIS — E78.5: ICD-10-CM

## 2018-02-07 LAB
ALBUMIN SERPL BCG-MCNC: 3.5 G/DL (ref 3.4–4.8)
ALP SERPL-CCNC: 59 U/L (ref 40–150)
ALT SERPL W P-5'-P-CCNC: 15 U/L (ref 8–55)
ANION GAP SERPL CALC-SCNC: 13 MMOL/L (ref 10–20)
AST SERPL-CCNC: 10 U/L (ref 5–34)
BASOPHILS # BLD AUTO: 0.1 THOU/UL (ref 0–0.2)
BASOPHILS NFR BLD AUTO: 0.6 % (ref 0–1)
BILIRUB SERPL-MCNC: 0.4 MG/DL (ref 0.2–1.2)
BUN SERPL-MCNC: 17 MG/DL (ref 8.4–25.7)
CALCIUM SERPL-MCNC: 8.9 MG/DL (ref 7.8–10.44)
CHLORIDE SERPL-SCNC: 93 MMOL/L (ref 98–107)
CK MB SERPL-MCNC: 1.4 NG/ML (ref 0–6.6)
CO2 SERPL-SCNC: 29 MMOL/L (ref 23–31)
CREAT CL PREDICTED SERPL C-G-VRATE: 0 ML/MIN (ref 70–130)
EOSINOPHIL # BLD AUTO: 0 THOU/UL (ref 0–0.7)
EOSINOPHIL NFR BLD AUTO: 0.4 % (ref 0–10)
GLOBULIN SER CALC-MCNC: 2.5 G/DL (ref 2.4–3.5)
GLUCOSE SERPL-MCNC: 212 MG/DL (ref 83–110)
HGB BLD-MCNC: 14.3 G/DL (ref 14–18)
LYMPHOCYTES # BLD: 0.5 THOU/UL (ref 1.2–3.4)
LYMPHOCYTES NFR BLD AUTO: 5.3 % (ref 21–51)
MCH RBC QN AUTO: 32.7 PG (ref 27–31)
MCV RBC AUTO: 95.3 FL (ref 80–94)
MONOCYTES # BLD AUTO: 0.4 THOU/UL (ref 0.11–0.59)
MONOCYTES NFR BLD AUTO: 4.7 % (ref 0–10)
NEUTROPHILS # BLD AUTO: 8.1 THOU/UL (ref 1.4–6.5)
NEUTROPHILS NFR BLD AUTO: 88.9 % (ref 42–75)
PLATELET # BLD AUTO: 274 THOU/UL (ref 130–400)
POTASSIUM SERPL-SCNC: 4.7 MMOL/L (ref 3.5–5.1)
RBC # BLD AUTO: 4.37 MILL/UL (ref 4.7–6.1)
SODIUM SERPL-SCNC: 130 MMOL/L (ref 136–145)
TROPONIN I SERPL DL<=0.01 NG/ML-MCNC: (no result) NG/ML (ref ?–0.03)
WBC # BLD AUTO: 9.1 THOU/UL (ref 4.8–10.8)

## 2018-02-07 PROCEDURE — 82945 GLUCOSE OTHER FLUID: CPT

## 2018-02-07 PROCEDURE — 93005 ELECTROCARDIOGRAM TRACING: CPT

## 2018-02-07 PROCEDURE — 71046 X-RAY EXAM CHEST 2 VIEWS: CPT

## 2018-02-07 PROCEDURE — 83986 ASSAY PH BODY FLUID NOS: CPT

## 2018-02-07 PROCEDURE — 80053 COMPREHEN METABOLIC PANEL: CPT

## 2018-02-07 PROCEDURE — 84157 ASSAY OF PROTEIN OTHER: CPT

## 2018-02-07 PROCEDURE — 87070 CULTURE OTHR SPECIMN AEROBIC: CPT

## 2018-02-07 PROCEDURE — 83880 ASSAY OF NATRIURETIC PEPTIDE: CPT

## 2018-02-07 PROCEDURE — 88112 CYTOPATH CELL ENHANCE TECH: CPT

## 2018-02-07 PROCEDURE — 85379 FIBRIN DEGRADATION QUANT: CPT

## 2018-02-07 PROCEDURE — 71045 X-RAY EXAM CHEST 1 VIEW: CPT

## 2018-02-07 PROCEDURE — 94640 AIRWAY INHALATION TREATMENT: CPT

## 2018-02-07 PROCEDURE — 88305 TISSUE EXAM BY PATHOLOGIST: CPT

## 2018-02-07 PROCEDURE — C1729 CATH, DRAINAGE: HCPCS

## 2018-02-07 PROCEDURE — 82553 CREATINE MB FRACTION: CPT

## 2018-02-07 PROCEDURE — 85025 COMPLETE CBC W/AUTO DIFF WBC: CPT

## 2018-02-07 PROCEDURE — 84484 ASSAY OF TROPONIN QUANT: CPT

## 2018-02-07 PROCEDURE — 80048 BASIC METABOLIC PNL TOTAL CA: CPT

## 2018-02-07 PROCEDURE — A4216 STERILE WATER/SALINE, 10 ML: HCPCS

## 2018-02-07 PROCEDURE — 94760 N-INVAS EAR/PLS OXIMETRY 1: CPT

## 2018-02-07 PROCEDURE — 36415 COLL VENOUS BLD VENIPUNCTURE: CPT

## 2018-02-07 PROCEDURE — 93970 EXTREMITY STUDY: CPT

## 2018-02-07 PROCEDURE — 87205 SMEAR GRAM STAIN: CPT

## 2018-02-07 PROCEDURE — 36416 COLLJ CAPILLARY BLOOD SPEC: CPT

## 2018-02-07 PROCEDURE — 83615 LACTATE (LD) (LDH) ENZYME: CPT

## 2018-02-07 PROCEDURE — 89051 BODY FLUID CELL COUNT: CPT

## 2018-02-07 PROCEDURE — 87206 SMEAR FLUORESCENT/ACID STAI: CPT

## 2018-02-07 PROCEDURE — 85060 BLOOD SMEAR INTERPRETATION: CPT

## 2018-02-07 PROCEDURE — 87116 MYCOBACTERIA CULTURE: CPT

## 2018-02-07 NOTE — RAD
TWO VIEW CHEST:

2/7/18

 

HISTORY: 

Dyspnea. 

 

Correlation made to recent chest film of 1/30/18 and chest CT of 1/22/18.

 

FINDINGS/IMPRESSION:  

The left pleural effusion has increased since the 1/30/18 exam. There is now moderately large left pl
eural effusion. 

 

There continues to be bilateral patchy alveolar infiltrates. These were present previously but appear
s slightly more extensive today especially in the right mid and lower lung field. 

 

POS: [facility]

## 2018-02-08 LAB
GLUCOSE PLR-MCNC: 268 MG/DL
LDH PLR L TO P-CCNC: 474 U/L
NEUTROPHILS NFR FLD: 22 %
NONHEMATIC CELLS NFR FLD MANUAL: 38 %
PROT PLR-MCNC: 3 G/DL
RBC # FLD AUTO: 1875 /CUMM
RBC BACKGROUND COUNT: 0
TROPONIN I SERPL DL<=0.01 NG/ML-MCNC: (no result) NG/ML (ref ?–0.03)
TROPONIN I SERPL DL<=0.01 NG/ML-MCNC: (no result) NG/ML (ref ?–0.03)
WBC # FLD AUTO: 501 /CUMM
WBC BACKGROUND COUNT: 0

## 2018-02-08 PROCEDURE — 0W9B3ZX DRAINAGE OF LEFT PLEURAL CAVITY, PERCUTANEOUS APPROACH, DIAGNOSTIC: ICD-10-PCS | Performed by: INTERNAL MEDICINE

## 2018-02-08 RX ADMIN — MULTIPLE VITAMINS W/ MINERALS TAB SCH TAB: TAB at 08:53

## 2018-02-08 RX ADMIN — ASPIRIN SCH MG: 81 TABLET ORAL at 08:53

## 2018-02-08 NOTE — CON
DATE OF CONSULTATION:  2018

 

SERVICE:  Pulmonary Medicine.

 

REASON FOR CONSULTATION:  Pleural effusion.

 

HISTORY OF PRESENT ILLNESS:  The patient is a 77-year-old white male with past 
medical history significant for COPD and adenocarcinoma of the lung, which is 
widely metastatic.  He has had a pleural effusion that has been present on a 
couple of imaging studies and progressively getting larger.  The patient also 
notes increasing dyspnea with exertion.  He had episodes of hypoxemia and was 
subsequently brought to the hospital.  He has been given some nebulized 
medications and steroids.  He has had a significant improvement in his 
breathing.  Overall, he denies any fevers, chills, nausea, vomiting or chest 
discomfort.

 

PAST MEDICAL HISTORY:

1.  Adenocarcinoma of the lung, widely metastatic.

2.  Chronic obstructive pulmonary disease.

3.  Chronic hypoxic respiratory failure, requiring 3 liters nasal cannula at 
home.

4.  Hypertension.

5.  Dyslipidemia.

6.  Type 2 diabetes mellitus.

7.  History of colon cancer, status post colectomy.

 

PAST SURGICAL HISTORY:

1.  Coronary artery bypass graft.

2.  Redo coronary artery bypass graft.

3.  Herniorrhaphy.

4.  Appendectomy.

5.  Cardiac catheterization.

6.  Inguinal hernia repair.

7.  Laparoscopic partial colectomy.

8.  Left lower lobectomy.

9.  Mediastinoscopy with biopsy.

 

ALLERGIES:  IODINE.

 

MEDICATIONS:  List of his inpatient medications were reviewed.  No specific 
updates were made.  The patient indicates taking Plavix, but I do not see it on 
the home medication list and is not currently listed as an inpatient 
medication.  Either way, if he took it at home, the last dose was yesterday.

 

SOCIAL HISTORY:  He lives at home with his wife.  He is an avid bowler.  He 
uses his left arm for bowling.  He has a history of smoking, but does not do 
anything currently.  He denies any alcohol or illicit drug use.  He has no 
exposure to chemicals, dust, asbestosis or tuberculosis

 

FAMILY HISTORY:  Noncontributory.

 

REVIEW OF SYSTEMS:  General, head, ears, eyes, nose, throat, cardiovascular, 
respiratory, GI, , musculoskeletal, neurologic and skin is negative except as 
mentioned in the HPI.

 

PHYSICAL EXAMINATION:

VITAL SIGNS:  Afebrile, pulse 92, blood pressure 127/82, respirations 22, 
saturation 92% on 3 liters nasal cannula.

GENERAL:  This patient is awake, alert, in no apparent distress.

LUNGS:  Reduced air entry on the left.  There is a prolonged expiratory phase 
and polyphonic wheezing.  Crackles are evident.

HEART:  Normal rate, regular.

ABDOMEN:  Soft, nontender, nondistended.  Bowel sounds are positive.

MUSCULOSKELETAL:  No cyanosis or clubbing.  No pitting in the bilateral lower 
extremities.

NEUROLOGIC:  Grossly nonfocal.

 

LABORATORY DATA:  WBC 9.1, hemoglobin 14.3, platelets 274,000.  D-dimer 1.59.  
Cardiac enzymes are negative x3.  BNP 83.  Liver function studies and basic 
metabolic profile are unremarkable.

 

IMAGIN.  Ultrasound of the bilateral lower extremities demonstrates no evidence of 
deep vein thrombosis.

2.  Chest x-ray demonstrates interval enlargement in the left side pleural 
effusion.

 

ASSESSMENT:

1.  Acute on chronic hypoxic respiratory failure.  

2.  Adenocarcinoma of the lung, widely metastatic.

3.  Pleural effusion on the left, enlarging.

4.  History of left lower lobectomy.

5.  Chronic obstructive pulmonary disease with acute exacerbation.

 

PLAN:  We will put the patient on very low dose of steroids, and frequent 
nebulized medications.  We will continue our antibiotics.  I am going to do a 
diagnostic and therapeutic thoracentesis.  If this is helpful, and the patient 
has malignant cells in this fluid, the next time he has recurrence of fluid, he 
may be a good candidate for PleurX catheter placement that we may need to take 
care not to put the tube in place that would affect the swing of his left arm 
during bowling if possible as this is one of the things that the patient really 
truly enjoys doing.  Pulmonary Critical Care will continue to follow, but Dr. Desai will assume care in the morning.

 

70 minutes have been devoted to this patient in various activities.  I 
personally reviewed all imaging studies and laboratory data noted within this 
document.  For at least half of this time, I was interacting with the patient 
at the bedside or coordinating care with the care team.  For the remainder of 
the time I was immediately available to the patient in the hospital unit.  



GREGORY

## 2018-02-08 NOTE — ULT
EXAM:

BILATERAL LOWER EXTREMITY VENOUS ULTRASOUND WITH DOPPLER:

 

HISTORY: 

Leg edema.

 

COMPARISON: 

None.

 

TECHNIQUE: 

Gray scale, color flow, Doppler imaging with spectral waveform analysis is performed of the left and 
right lower extremity system.

 

FINDINGS: 

Bilaterally, there is compressibility, presence of flow, and augmentation in the common femoral vein,
 femoral vein, and proximal vein.  There is flow in bilateral greater saphenous veins, profunda veins
, and posterior tibial veins.

 

IMPRESSION: 

No evidence of thrombus of right or left lower extremity venous system.

 

POS: [facility]

## 2018-02-08 NOTE — OP
DATE OF SERVICE:  02/08/2018

 

SERVICE:  Pulmonary Medicine.

 

PROCEDURE:  Left-sided pleural drainage with catheter insertion under ultrasound guidance.

 

CONSENT:  Risks and benefits of this procedure were explained to the patient.  All questions were ans
wered and alternative options explained.

 

STAFF PHYSICIAN:  Saul Manrique M.D.

 

MEDICATIONS USED:  Lidocaine 1% without epinephrine, total quantity 8 mL

 

PREOPERATIVE DIAGNOSES:

1.  Acute hypoxic respiratory failure.

2.  Pleural effusion.

 

POSTPROCEDURE DIAGNOSES:

1.  Acute hypoxic respiratory failure.

2.  Pleural effusion.

 

DESCRIPTION OF PROCEDURE:  A timeout was performed by the procedure team and the patient.  The patien
t was positively identified using name and date of birth.  The procedure site was marked.  Vital sign
 monitoring was accomplished by noninvasive hemodynamic monitoring, pulse oximetry, and telemetry.  I
n the seated position, the left posterior hemithorax was examined using ultrasound probe.  The diaphr
agm and pleural fluid were easily identified.  The skin was prepped and draped in sterile fashion and
 anesthetized with 1% lidocaine without epinephrine.  A finder needle was inserted into the pleural s
pace with return of cloudy straw colored fluid.  The pleural drainage catheter was inserted in the sa
me location and a total quantity of 1800 mL of fluid was withdrawn by syringe pump technique.  [facility].  Evacuation of fluid was terminated because we arrived at -20 cm of pleural
 fluid pressure.  The intact catheter was withdrawn on exhalation and a sterile dressing was applied.
  The patient had stable vital signs throughout the entire procedure.

 

ESTIMATED BLOOD LOSS:  1 mL

 

COMPLICATIONS:  None.

## 2018-02-08 NOTE — HP
DATE OF ADMISSION:  02/07/2018

 

PRIMARY CARE PHYSICIAN:  Dr. Cali Conde.

 

PRIMARY PULMONOLOGIST:  Dr. Desai.

 

CHIEF COMPLAINT:  Shortness of breath.

 

HISTORY OF PRESENT ILLNESS:  Patient is a 77-year-old male with COPD, chronic respiratory failure on 
home oxygen, non-small cell lung cancer who presented to the emergency room with worsening shortness 
of breath over the last 3-4 days duration.

 

Patient was admitted at this facility from 01/20/2018 to 01/22/2018 with shortness of breath.  He was
 discharged home on prednisone taper.

 

Over the last 2-3 weeks, the patient has shortness of breath that is progressively getting worse.  Ov
er the last 2-3 days, the patient is not able to move around due to oxygen desaturation.  He has been
 spending most of his time on the recliner.  He also had some cough productive of thick whitish phleg
m.  He also had wheezing along with some chest tightness.  He denies any orthopnea or paroxysmal noct
urnal dyspnea.  He had some leg swelling, especially towards the end of the day.  He denies any sick 
contacts or fever.

 

In the emergency room, his chest x-ray was consistent with left pleural effusion that has increased s
little 01/30/2018 exam.  He also had some bilateral patchy alveolar infiltrates, which were present pre
viously, but appeared more extensive on the repeat x-ray today.  He received DuoNeb in the emergency 
room.

 

PAST MEDICAL HISTORY:

1.  Non-small cell lung cancer, currently followed by Dr. Power.

2.  Chronic obstructive pulmonary disease.

3.  Chronic respiratory failure, on home oxygen 3-3.5 liters.

4.  Hypertension.

5.  Diabetes mellitus type 2.

6.  Colon cancer, status post colectomy.

7.  Dyslipidemia.

 

PAST SURGICAL HISTORY:

1.  Coronary artery bypass grafting in 1993 with a redo CABG in 2003.

2.  Hernia surgery.

3.  Appendectomy.

4.  Cardiac catheterization.

5.  Inguinal hernia repair.

6.  Laparoscopic partial colectomy.

7.  Left lower lobectomy.

8.  Mediastinal exploration with biopsy.

 

ALLERGIES:  Patient is allergic to IODINE.

 

CURRENT HOME MEDICATIONS:  Per [facility], amlodipine 2.5 mg daily, aspirin 81 mg daily, carvedilol 12.
5 mg b.i.d., Zyrtec 10 mg daily, vitamin D3 2000 units daily, glucosamine 1 capsule daily, DuoNeb as 
needed, lisinopril 5 mg at bedtime, metformin 500 mg b.i.d., multivitamin 1 tablet daily, Actos 30 mg
 daily, prednisone dose unavailable, simvastatin 10 mg at bedtime, Incruse Ellipta 62.5 mcg daily, te
mazepam 15 mg at bedtime p.r.n.

 

SOCIAL HISTORY:  Patient currently lives at home.  He is a former smoker.  Denies any alcohol or drug
 use.

 

FAMILY HISTORY:  Negative for premature coronary artery disease.

 

REVIEW OF SYSTEMS:  The following complete review of systems was negative, unless otherwise mentioned
 in the HPI or below:

Constitutional:  Weight loss or gain, ability to conduct usual activities.

Skin:  Rash, itching.

Eyes:  Double vision, pain.

ENT/Mouth:  Nose bleeding, neck stiffness, pain, tenderness.

Cardiovascular:  Palpitations, dyspnea on exertion, orthopnea.

Respiratory:  Shortness of breath, wheezing, cough, hemoptysis, fever or night sweats.

Gastrointestinal:  Poor appetite, abdominal pain, heartburn, nausea, vomiting, constipation, or diarr
hea.

Genitourinary:  Urgency, frequency, dysuria, nocturia.

Musculoskeletal:  Pain, swelling.

Neurologic/Psychiatric:  Anxiety, depression.

Allergy/Immunologic:  Skin rash, bleeding tendency.

 

PHYSICAL EXAMINATION:

VITAL SIGNS:  As discussed above.

GENERAL:  A 77-year-old male in no apparent distress.  Feels somewhat better after nebulizer treatmen
t.

HEENT:  Head is atraumatic, normocephalic.  Sclerae are anicteric.  Moist mucous membranes.  No oral 
lesion.

NECK:  Supple, no JVD appreciated.  No carotid bruit.

LUNGS:  Showed expiratory wheezing, which is scattered with diminished air entry at bases, left more 
than right.

CARDIOVASCULAR:  Heart S1, S2 present.  Regular rate and rhythm.  No rubs or gallops appreciated.  He
aled midline scar from previous CABG.

ABDOMEN:  Soft, nontender, bowel sounds present.

EXTREMITIES:  Trace edema in bilateral lower extremities.

SKIN:  Warm and dry.

LYMPH NODES:  No palpable lymph nodes in the neck.

PERIPHERAL VASCULAR:  Radial pulses palpable bilaterally.

MUSCULOSKELETAL:  No joint swelling or tenderness.

 

LABORATORY DATA AND X-RAY FINDINGS:

1.  CBC showed WBC of 9.1 with hemoglobin 14.3, hematocrit 41.7, platelet of 274.

2.  D-dimer was 1.59.  Chemistries showed sodium 130, potassium 4.7, chloride 93, bicarbonate 29, BUN
 17, creatinine 0.8.

3.  Troponins were negative.

4.  Chest x-ray by my review as discussed above.  EKG by my review showed sinus rhythm with nonspecif
ic ST-T wave changes.

 

IMPRESSION:

1.  Respiratory distress secondary to chronic obstructive pulmonary disease exacerbation.

2.  Left-sided pleural effusion.

3.  Lung adenocarcinoma followed by Dr. Power.

4.  Coronary artery disease, status post myocardial infarction and coronary artery bypass graft in th
e past.

5.  Diabetes mellitus type 2.

6.  Chronic respiratory failure, on home oxygen.

7.  Hypertension.

8.  Chronic anemia.

9.  History of colon cancer, status post resection.

10.  Chronic hyponatremia.

11.  Chronic kidney disease stage 3.

 

PLAN:  The patient is currently admitted on the telemetry unit.  Home medications will be resumed.  BENITA Desai will be consulted.  Nebulizer treatments.  We will resume home dose of prednisone.  We will 
hold IV steroids for now.  Hold antibiotics for now.

 

Plan of care was discussed with the patient in detail.  He stated understanding.

## 2018-02-08 NOTE — PDOC.EVN
Event Note





- Event Note


Event Note: 





Patient seen and examined. Note dicated. Full code. DPAO - self/family

## 2018-02-09 LAB
ANION GAP SERPL CALC-SCNC: 11 MMOL/L (ref 10–20)
BASOPHILS # BLD AUTO: 0 THOU/UL (ref 0–0.2)
BASOPHILS NFR BLD AUTO: 0.4 % (ref 0–1)
BUN SERPL-MCNC: 13 MG/DL (ref 8.4–25.7)
CALCIUM SERPL-MCNC: 8.9 MG/DL (ref 7.8–10.44)
CHLORIDE SERPL-SCNC: 92 MMOL/L (ref 98–107)
CO2 SERPL-SCNC: 30 MMOL/L (ref 23–31)
CREAT CL PREDICTED SERPL C-G-VRATE: 86 ML/MIN (ref 70–130)
EOSINOPHIL # BLD AUTO: 0.4 THOU/UL (ref 0–0.7)
EOSINOPHIL NFR BLD AUTO: 3.3 % (ref 0–10)
GLUCOSE SERPL-MCNC: 163 MG/DL (ref 83–110)
HGB BLD-MCNC: 14.1 G/DL (ref 14–18)
LYMPHOCYTES # BLD: 0.9 THOU/UL (ref 1.2–3.4)
LYMPHOCYTES NFR BLD AUTO: 7.9 % (ref 21–51)
MCH RBC QN AUTO: 33.7 PG (ref 27–31)
MCV RBC AUTO: 101 FL (ref 80–94)
MONOCYTES # BLD AUTO: 1.3 THOU/UL (ref 0.11–0.59)
MONOCYTES NFR BLD AUTO: 11.7 % (ref 0–10)
NEUTROPHILS # BLD AUTO: 8.5 THOU/UL (ref 1.4–6.5)
NEUTROPHILS NFR BLD AUTO: 76.7 % (ref 42–75)
PLATELET # BLD AUTO: 272 THOU/UL (ref 130–400)
POTASSIUM SERPL-SCNC: 4.4 MMOL/L (ref 3.5–5.1)
RBC # BLD AUTO: 4.19 MILL/UL (ref 4.7–6.1)
SODIUM SERPL-SCNC: 129 MMOL/L (ref 136–145)
WBC # BLD AUTO: 11.1 THOU/UL (ref 4.8–10.8)

## 2018-02-09 RX ADMIN — INSULIN LISPRO PRN UNIT: 100 INJECTION, SOLUTION INTRAVENOUS; SUBCUTANEOUS at 17:08

## 2018-02-09 RX ADMIN — MULTIPLE VITAMINS W/ MINERALS TAB SCH TAB: TAB at 08:11

## 2018-02-09 RX ADMIN — ASPIRIN SCH MG: 81 TABLET ORAL at 08:14

## 2018-02-09 NOTE — PDOC.PN
- Subjective


Encounter Start Date: 02/09/18


Encounter Start Time: 07:00





Pt seen for followup re: pleural effusion.  Reports breathing is better.  had 

episodes of anxiety.  No nausea, vomiting or diarrhea.





- Objective


Resuscitation Status: 


 











Resuscitation Status           FULL:Full Resuscitation














MAR Reviewed: Yes


Vital Signs & Weight: 


 Vital Signs (12 hours)











  Temp Pulse Resp BP BP BP Pulse Ox


 


 02/09/18 11:43  98.2 F  64  18    146/69 H  96


 


 02/09/18 09:26  98.6 F  84  20     92 L


 


 02/09/18 09:25   84  16    


 


 02/09/18 08:22  98.6 F  84  20    149/76 H  92 L


 


 02/09/18 08:11   88   135/72   


 


 02/09/18 08:10     135/72   


 


 02/09/18 08:06   88     135/72 


 


 02/09/18 06:25        91 L


 


 02/09/18 06:24   75  12    


 


 02/09/18 03:59  97.9 F  75  16   126/78   93 L


 


 02/09/18 02:20   78  20     90 L


 


 02/09/18 00:59        93 L








 Weight











Weight                         162 lb 4.8 oz














I&O: 


 











 02/08/18 02/09/18 02/10/18





 06:59 06:59 06:59


 


Intake Total 240  


 


Balance 240  











Result Diagrams: 


 02/09/18 04:37





 02/09/18 04:37





Phys Exam





- Physical Examination


Constitutional: NAD


HEENT: PERRLA, moist MMs, sclera anicteric, oral pharynx no lesions


Neck: supple


Respiratory: no wheezing, no rales, no rhonchi, clear to auscultation bilateral


Diminished air entry L base


Cardiovascular: RRR, no rub


Gastrointestinal: soft, non-tender


Musculoskeletal: edema present


Neurological: moves all 4 limbs


Psychiatric: normal affect, A&O x 3





Dx/Plan


(1) Pleural effusion


Code(s): J90 - PLEURAL EFFUSION, NOT ELSEWHERE CLASSIFIED   Status: Acute   





(2) CAD (coronary artery disease)


Code(s): I25.10 - ATHSCL HEART DISEASE OF NATIVE CORONARY ARTERY W/O ANG PCTRS 

  Status: Chronic   


Qualifiers: 


   Coronary Disease-Associated Artery/Lesion type: native artery   Native vs. 

transplanted heart: native heart   Associated angina: without angina   

Qualified Code(s): I25.10 - Atherosclerotic heart disease of native coronary 

artery without angina pectoris   





(3) Chronic obstructive lung disease


Status: Chronic   





(4) Diabetes mellitus type 2 in nonobese


Code(s): E11.9 - TYPE 2 DIABETES MELLITUS WITHOUT COMPLICATIONS   Status: 

Chronic   





(5) Hypertension


Code(s): I10 - ESSENTIAL (PRIMARY) HYPERTENSION   Status: Chronic   


Qualifiers: 


   Hypertension type: essential hypertension   Qualified Code(s): I10 - 

Essential (primary) hypertension   





(6) Hyponatremia


Code(s): E87.1 - HYPO-OSMOLALITY AND HYPONATREMIA   Status: Chronic   





- Plan


out of bed/ambulate, DVT proph w/SCDs





* .


Continue oxygen PRN, steroids and bronchodilators.


s/p thoracentesis.


Accuchecks, insulin sliding scale.


Monitor vital signs, titrate antihypertensives as needed.


Monitor lytes.





Review of Systems





- Review of Systems


Constitutional: negative: fever, chills, sweats, weakness, malaise


Respiratory: SOB with Excertion.  negative: Cough, Dry, Shortness of Breath, 

Hemoptysis, Pleuritic Pain, Sputum, Wheezing


Cardiovascular: negative: chest pain, palpitations, orthopnea, paroxysmal 

nocturnal dyspnea, edema, light headedness


Gastrointestinal: negative: Nausea, Vomiting, Abdominal Pain, Diarrhea, 

Constipation, Melena, Hematochezia


Genitourinary: negative: Dysuria, Frequency, Incontinence, Hematuria, Retention





- Medications/Allergies


Allergies/Adverse Reactions: 


 Allergies











Allergy/AdvReac Type Severity Reaction Status Date / Time


 


Iodinated Contrast- Oral and Allergy   Verified 02/07/18 23:36





IV Dye     


 


iodine Allergy   Verified 02/07/18 23:36











Medications: 


 Current Medications





Albuterol/Ipratropium (Duoneb)  3 ml NEB [id number] [facility]


   Last Admin: 02/09/18 09:25 Dose:  3 ml


Albuterol/Ipratropium (Duoneb)  3 ml NEB Q2H PRN


   PRN Reason: SOB &/or Wheezing


   Last Admin: 02/08/18 13:01 Dose:  3 ml


Amlodipine Besylate (Norvasc)  2.5 mg PO [facility]


   Last Admin: 02/09/18 08:11 Dose:  2.5 mg


Aspirin (Ecotrin)  81 mg PO [facility]


   Last Admin: 02/09/18 08:14 Dose:  81 mg


Benzonatate (Tessalon)  100 mg PO TID PRN


   PRN Reason: Cough


   Last Admin: 02/09/18 11:57 Dose:  100 mg


Calcium Carbonate (Tums)  1,000 mg PO Q4H PRN


   PRN Reason: Heartburn  or Indigestion


Carvedilol (Coreg)  12.5 mg PO BID-[facility]


   Last Admin: 02/09/18 08:10 Dose:  12.5 mg


Cholecalciferol (Vitamin D3)  2,000 units PO DAILY [facility]


   Last Admin: 02/09/18 08:14 Dose:  2,000 units


Clonidine (Catapres)  0.1 mg PO Q4H PRN


   PRN Reason: Systolic BP > 180


Dextrose/Water (Dextrose 50%)  25 gm SLOW IVP PRN PRN


   PRN Reason: Hypoglycemia


Docusate Sodium (Colace)  100 mg PO BID [facility]


   Last Admin: 02/09/18 08:13 Dose:  100 mg


Famotidine (Pepcid)  20 mg PO BID [facility]


   Last Admin: 02/09/18 08:13 Dose:  20 mg


Furosemide (Lasix)  40 mg PO DAILY-[facility]


   Stop: 02/10/18 07:31


   Last Admin: 02/09/18 08:13 Dose:  40 mg


Glucagon (Glucagon)  1 mg IM PRN PRN


   PRN Reason: Hypoglycemia


Dextrose/Water (D5w)  1,000 mls @ 0 mls/hr IV .Q0M PRN; As Directed


   PRN Reason: Hypoglycemia


Insulin Human Lispro (Humalog)  0 units SC .MILD SLIDING SCALE PRN


   PRN Reason: Mild Correctional Scale


Iron/Minerals/Multivitamins (Theragran M)  1 tab PO DAILY [facility]


   Last Admin: 02/09/18 08:11 Dose:  1 tab


Lisinopril (Zestril)  5 mg PO HS [facility]


   Last Admin: 02/08/18 20:35 Dose:  5 mg


Loratadine (Claritin)  10 mg PO DAILY [facility]


   Last Admin: 02/09/18 08:13 Dose:  10 mg


Metformin HCl (Glucophage Xr)  500 mg PO BID [facility]


   Last Admin: 02/09/18 08:40 Dose:  500 mg


Nitroglycerin (Nitrostat)  0.4 mg PO Q5MIN PRN


   PRN Reason: Chest Pain


Ondansetron HCl (Zofran Odt)  4 mg PO Q6H PRN


   PRN Reason: Nausea/Vomiting


Ondansetron HCl (Zofran)  4 mg IVP Q6H PRN


   PRN Reason: Nausea/Vomiting


Pioglitazone HCl (Actos)  30 mg PO QAM [facility]


   Last Admin: 02/09/18 08:39 Dose:  30 mg


Prednisone (Prednisone)  20 mg PO DAILY [facility]


   Last Admin: 02/09/18 08:13 Dose:  20 mg


Simvastatin (Zocor)  10 mg PO HS [facility]


   Last Admin: 02/08/18 20:34 Dose:  10 mg


Temazepam (Restoril)  15 mg PO HSPRN PRN


   PRN Reason: Anxiety

## 2018-02-09 NOTE — PRG
DATE OF SERVICE:  02/09/2018

 

SUBJECTIVE:  Chai Santana says he feels 100% better.  His effusion cytology is pending.  Appears 
to be exudate with the red cell predominance.

 

OBJECTIVE:

VITAL SIGNS:  He is afebrile, heart rate 64, respiratory rate 18, oximetry is 96, blood pressure 146/
69.

CHEST:  Very few wheezes on chest exam.

HEART:  Regular rhythm.

 

IMPRESSION:  Status post evacuation of significant pleural effusion that is new.  We will await patho
logy.  I see no reason to increase his steroids.

## 2018-02-10 RX ADMIN — MULTIPLE VITAMINS W/ MINERALS TAB SCH TAB: TAB at 10:08

## 2018-02-10 RX ADMIN — ASPIRIN SCH MG: 81 TABLET ORAL at 10:17

## 2018-02-10 RX ADMIN — MOMETASONE FUROATE AND FORMOTEROL FUMARATE DIHYDRATE SCH PUFF: 200; 5 AEROSOL RESPIRATORY (INHALATION) at 18:35

## 2018-02-10 RX ADMIN — INSULIN LISPRO PRN UNIT: 100 INJECTION, SOLUTION INTRAVENOUS; SUBCUTANEOUS at 11:15

## 2018-02-10 RX ADMIN — INSULIN LISPRO PRN UNIT: 100 INJECTION, SOLUTION INTRAVENOUS; SUBCUTANEOUS at 16:42

## 2018-02-10 NOTE — PRG
DATE OF SERVICE:  02/10/2018

 

SUBJECTIVE:  He is still complaining of short of breath.  He is coughing some yellow sputum.

 

PHYSICAL EXAMINATION:

VITAL SIGNS:  Sats are 95% on 2 liters, respirations 18, temperature 97, blood pressure 110/68.

CHEST:  Decreased breath sounds, prolonged expiration.

CARDIAC:  Normal S1 and S2.  No gallops.

ABDOMEN:  Soft.  No masses.

 

IMPRESSION:

1.  Chronic obstructive pulmonary disease exacerbation, bronchitis.

2.  Left pleural effusion, status post thoracentesis.

 

PLAN:  Await results of the thoracentesis.  Neb treatment as prescribed, prednisone.  I have added Du
juancarlos.

 

We will follow.

## 2018-02-10 NOTE — PDOC.PN
- Subjective


Encounter Start Date: 02/10/18


Encounter Start Time: 14:53





Pt seen for followup re: pleural effusion.  Feels better, not at baseline.  

Cough+, SOBOE+





- Objective


Resuscitation Status: 


 











Resuscitation Status           FULL:Full Resuscitation














MAR Reviewed: Yes


Vital Signs & Weight: 


 Vital Signs (12 hours)











  Temp Pulse Resp BP BP BP Pulse Ox


 


 02/10/18 14:32   88  16    


 


 02/10/18 12:00        98


 


 02/10/18 11:15  97.6 F  75  18   110/68   91 L


 


 02/10/18 10:18   76     


 


 02/10/18 10:12   76  16    


 


 02/10/18 08:04     119/57 L   


 


 02/10/18 08:00  96.4 F L  73  18    


 


 02/10/18 07:05  96.4 F L  73  18    124/65  96


 


 02/10/18 06:20   82  16     89 L


 


 02/10/18 03:55  98.0 F  85  16   118/64   92 L








 Weight











Weight                         164 lb 4.8 oz














I&O: 


 











 02/09/18 02/10/18 02/11/18





 06:59 06:59 06:59


 


Intake Total  1250 


 


Output Total  750 


 


Balance  500 











Result Diagrams: 


 02/09/18 04:37





 02/09/18 04:37


Additional Labs: 


 Accuchecks











  02/10/18 02/10/18 02/09/18





  11:07 06:04 19:11


 


POC Glucose  223 H  159 H  212 H














  02/09/18





  16:00


 


POC Glucose  359 H














Phys Exam





- Physical Examination


Constitutional: NAD


HEENT: moist MMs


Neck: supple


Respiratory: clear to auscultation bilateral


Diminished air entry L base


Cardiovascular: RRR


Gastrointestinal: soft


Neurological: moves all 4 limbs


Psychiatric: normal affect





Dx/Plan


(1) Pleural effusion


Code(s): J90 - PLEURAL EFFUSION, NOT ELSEWHERE CLASSIFIED   Status: Acute   





(2) CAD (coronary artery disease)


Code(s): I25.10 - ATHSCL HEART DISEASE OF NATIVE CORONARY ARTERY W/O ANG PCTRS 

  Status: Chronic   


Qualifiers: 


   Coronary Disease-Associated Artery/Lesion type: native artery   Native vs. 

transplanted heart: native heart   Associated angina: without angina   

Qualified Code(s): I25.10 - Atherosclerotic heart disease of native coronary 

artery without angina pectoris   





(3) Chronic obstructive lung disease


Status: Chronic   





(4) Diabetes mellitus type 2 in nonobese


Code(s): E11.9 - TYPE 2 DIABETES MELLITUS WITHOUT COMPLICATIONS   Status: 

Chronic   





(5) Hypertension


Code(s): I10 - ESSENTIAL (PRIMARY) HYPERTENSION   Status: Chronic   


Qualifiers: 


   Hypertension type: essential hypertension   Qualified Code(s): I10 - 

Essential (primary) hypertension   





(6) Hyponatremia


Code(s): E87.1 - HYPO-OSMOLALITY AND HYPONATREMIA   Status: Chronic   





- Plan


plan discussed w/ family, PT/OT, out of bed/ambulate





* .


s/p thoracentesis.


Start sodium tablets (pt takes them at home).


Continue oxygen, steroids and bronchodilators.





Review of Systems





- Review of Systems


Constitutional: negative: fever, chills, sweats, weakness, malaise


Respiratory: Cough, SOB with Excertion.  negative: Dry, Shortness of Breath, 

Hemoptysis, Pleuritic Pain, Sputum, Wheezing





- Medications/Allergies


Allergies/Adverse Reactions: 


 Allergies











Allergy/AdvReac Type Severity Reaction Status Date / Time


 


Iodinated Contrast- Oral and Allergy   Verified 02/07/18 23:36





IV Dye     


 


iodine Allergy   Verified 02/07/18 23:36











Medications: 


 Current Medications





Albuterol/Ipratropium (Duoneb)  3 ml NEB [id number]


   Last Admin: 02/10/18 14:32 Dose:  3 ml


Albuterol/Ipratropium (Duoneb)  3 ml NEB Q2H PRN


   PRN Reason: SOB &/or Wheezing


   Last Admin: 02/08/18 13:01 Dose:  3 ml


Amlodipine Besylate (Norvasc)  2.5 mg PO QAM [facility]


   Last Admin: 02/10/18 10:18 Dose:  2.5 mg


Aspirin (Ecotrin)  81 mg PO QAM [facility]


   Last Admin: 02/10/18 10:17 Dose:  81 mg


Benzonatate (Tessalon)  100 mg PO TID PRN


   PRN Reason: Cough


   Last Admin: 02/09/18 19:10 Dose:  100 mg


Calcium Carbonate (Tums)  1,000 mg PO Q4H PRN


   PRN Reason: Heartburn  or Indigestion


Carvedilol (Coreg)  12.5 mg PO BID-WM [facility]


   Last Admin: 02/10/18 08:04 Dose:  12.5 mg


Cholecalciferol (Vitamin D3)  2,000 units PO DAILY [facility]


   Last Admin: 02/10/18 10:08 Dose:  2,000 units


Clonidine (Catapres)  0.1 mg PO Q4H PRN


   PRN Reason: Systolic BP > 180


Dextrose/Water (Dextrose 50%)  25 gm SLOW IVP PRN PRN


   PRN Reason: Hypoglycemia


Docusate Sodium (Colace)  100 mg PO BID [facility]


   Last Admin: 02/10/18 10:08 Dose:  100 mg


Famotidine (Pepcid)  20 mg PO BID [facility]


   Last Admin: 02/10/18 10:19 Dose:  20 mg


Glucagon (Glucagon)  1 mg IM PRN PRN


   PRN Reason: Hypoglycemia


Dextrose/Water (D5w)  1,000 mls @ 0 mls/hr IV .Q0M PRN; As Directed


   PRN Reason: Hypoglycemia


Insulin Human Lispro (Humalog)  0 units SC .MILD SLIDING SCALE PRN


   PRN Reason: Mild Correctional Scale


   Last Admin: 02/10/18 11:15 Dose:  3 unit


Iron/Minerals/Multivitamins (Theragran M)  1 tab PO DAILY [facility]


   Last Admin: 02/10/18 10:08 Dose:  1 tab


Lisinopril (Zestril)  5 mg PO HS [facility]


   Last Admin: 02/09/18 19:11 Dose:  Not Given


Loratadine (Claritin)  10 mg PO DAILY [facility]


   Last Admin: 02/10/18 10:18 Dose:  10 mg


Metformin HCl (Glucophage Xr)  500 mg PO BID [facility]


   Last Admin: 02/10/18 11:07 Dose:  500 mg


Mometasone Furoate/Formoterol Fumar (Dulera 200 Mcg/5 Mcg Inhaler)  2 puff INH 

BID-RT [facility]


Nitroglycerin (Nitrostat)  0.4 mg PO Q5MIN PRN


   PRN Reason: Chest Pain


Ondansetron HCl (Zofran Odt)  4 mg PO Q6H PRN


   PRN Reason: Nausea/Vomiting


Ondansetron HCl (Zofran)  4 mg IVP Q6H PRN


   PRN Reason: Nausea/Vomiting


Pioglitazone HCl (Actos)  30 mg PO QAM [facility]


   Last Admin: 02/10/18 10:17 Dose:  30 mg


Prednisone (Prednisone)  20 mg PO DAILY [facility]


   Last Admin: 02/10/18 10:19 Dose:  20 mg


Simvastatin (Zocor)  10 mg PO HS [facility]


   Last Admin: 02/09/18 19:09 Dose:  10 mg


Sodium Chloride (Sodium Chloride)  1 gm PO DAILY [facility]


Sodium Chloride (Sodium Chloride)  1 gm PO 1430 [facility]


   Stop: 02/10/18 16:30


Temazepam (Restoril)  15 mg PO HSPRN PRN


   PRN Reason: Anxiety


   Last Admin: 02/09/18 22:24 Dose:  15 mg

## 2018-02-11 LAB
ANION GAP SERPL CALC-SCNC: 9 MMOL/L (ref 10–20)
BASOPHILS # BLD AUTO: 0 THOU/UL (ref 0–0.2)
BASOPHILS NFR BLD AUTO: 0.4 % (ref 0–1)
BUN SERPL-MCNC: 14 MG/DL (ref 8.4–25.7)
CALCIUM SERPL-MCNC: 8.4 MG/DL (ref 7.8–10.44)
CHLORIDE SERPL-SCNC: 93 MMOL/L (ref 98–107)
CO2 SERPL-SCNC: 31 MMOL/L (ref 23–31)
CREAT CL PREDICTED SERPL C-G-VRATE: 93 ML/MIN (ref 70–130)
EOSINOPHIL # BLD AUTO: 0.4 THOU/UL (ref 0–0.7)
EOSINOPHIL NFR BLD AUTO: 4 % (ref 0–10)
GLUCOSE SERPL-MCNC: 133 MG/DL (ref 83–110)
HGB BLD-MCNC: 12.7 G/DL (ref 14–18)
LYMPHOCYTES # BLD: 1 THOU/UL (ref 1.2–3.4)
LYMPHOCYTES NFR BLD AUTO: 10.2 % (ref 21–51)
MCH RBC QN AUTO: 34 PG (ref 27–31)
MCV RBC AUTO: 101 FL (ref 80–94)
MONOCYTES # BLD AUTO: 1.1 THOU/UL (ref 0.11–0.59)
MONOCYTES NFR BLD AUTO: 12.2 % (ref 0–10)
NEUTROPHILS # BLD AUTO: 6.8 THOU/UL (ref 1.4–6.5)
NEUTROPHILS NFR BLD AUTO: 73.3 % (ref 42–75)
PLATELET # BLD AUTO: 260 THOU/UL (ref 130–400)
POTASSIUM SERPL-SCNC: 3.6 MMOL/L (ref 3.5–5.1)
RBC # BLD AUTO: 3.75 MILL/UL (ref 4.7–6.1)
SODIUM SERPL-SCNC: 129 MMOL/L (ref 136–145)
WBC # BLD AUTO: 9.2 THOU/UL (ref 4.8–10.8)

## 2018-02-11 RX ADMIN — INSULIN LISPRO PRN UNIT: 100 INJECTION, SOLUTION INTRAVENOUS; SUBCUTANEOUS at 17:05

## 2018-02-11 RX ADMIN — MULTIPLE VITAMINS W/ MINERALS TAB SCH TAB: TAB at 08:07

## 2018-02-11 RX ADMIN — MOMETASONE FUROATE AND FORMOTEROL FUMARATE DIHYDRATE SCH PUFF: 200; 5 AEROSOL RESPIRATORY (INHALATION) at 06:41

## 2018-02-11 RX ADMIN — MOMETASONE FUROATE AND FORMOTEROL FUMARATE DIHYDRATE SCH PUFF: 200; 5 AEROSOL RESPIRATORY (INHALATION) at 18:23

## 2018-02-11 RX ADMIN — INSULIN LISPRO PRN UNIT: 100 INJECTION, SOLUTION INTRAVENOUS; SUBCUTANEOUS at 11:59

## 2018-02-11 RX ADMIN — ASPIRIN SCH MG: 81 TABLET ORAL at 08:05

## 2018-02-11 NOTE — PRG
DATE OF SERVICE:  02/11/2018

 

SUBJECTIVE:  The patient is seen and examined at bedside.  He is complaining about some shortness of 
breath, but his breathing treatment came late this morning.  His appetite is fair.  He does not have 
much complaints to offer.

 

OBJECTIVE:

VITAL SIGNS:  Blood pressure is 123/69, pulse is 84, respiratory rate is 16, temperature is 97.8.

HEENT:  His head is atraumatic, normocephalic.  Eyes are PERRLA.  Conjunctivae pinkish.  Oral mucosa 
is moist.

NECK:  Supple, no lymphadenopathy.  Thyroid is not palpable.

LUNGS:  Left base is dull and breath sounds with some crackles at the area, no wheezing.

HEART:  S1, S2 normal.  No S3, no S4.

ABDOMEN:  Soft, nontender.  Bowel sounds are present.  No organomegaly.

EXTREMITIES:  No clubbing, cyanosis, or edema.

NEUROLOGIC:  He is alert and oriented x3.

 

LABORATORY DATA:  Showed hemoglobin of 12.7, hematocrit 37.8, platelet count is 260.  White count 9.2
.  Sodium of 129, potassium 3.6, chloride 93, CO2 is 31, BUN 14, creatinine 0.71.  Glycemia is rangin
g from 134 to 306.  Microbiology:  Acid fast bacilli negative and pleural fluid culture is pending.  
I do not see any cytology results back.

 

IMPRESSION:

1.  Pleural effusion, status post thoracentesis.

2.  Coronary artery disease, chronic, stable.

3.  Chronic obstructive lung disease, chronic.

4.  Diabetes mellitus type 2, chronic.

5.  Hypertension, chronic.

6.  Hyponatremia, chronic.

 

PLAN:  Continue current regimen.  Pulmonary is going to make decision about discharge home and their 
recommendations.  We will continue his nebulizers.  We will continue his carvedilol, lisinopril, metf
ormin, Dulera, pioglitazone, simvastatin and 20 mg of a prednisone.

## 2018-02-11 NOTE — PRG
DATE OF SERVICE:  02/11/2018

 

SUBJECTIVE:  This morning, he is very short of breath, can barely walk even 10 feet without getting d
yspneic.

 

OBJECTIVE:

VITAL SIGNS:  Sats are 90% on 2 liters, temperature 97, blood pressure 110/60.

CHEST:  Reveals decreased breath sounds in the right lung with extensive rhonchi and crackles.

CARDIAC:  Normal S1, S2. No gallops.

ABDOMEN:  Soft.  No masses.

 

LABORATORY DATA:  White count 9000, H and H 11 and 32.  Sodium 129.

 

IMPRESSION:

1.  Pleural effusion status post thoracentesis, bronchogenic carcinoma.

2.  Chronic obstructive pulmonary disease with respiratory failure.

 

PLAN:  Awaiting cytology.  I am told he may need a total catheter for drainage for recurrent pleural 
effusion on his left side.

 

We will follow.

## 2018-02-12 LAB
ANION GAP SERPL CALC-SCNC: 10 MMOL/L (ref 10–20)
BUN SERPL-MCNC: 16 MG/DL (ref 8.4–25.7)
CALCIUM SERPL-MCNC: 8.7 MG/DL (ref 7.8–10.44)
CHLORIDE SERPL-SCNC: 95 MMOL/L (ref 98–107)
CO2 SERPL-SCNC: 31 MMOL/L (ref 23–31)
CREAT CL PREDICTED SERPL C-G-VRATE: 85 ML/MIN (ref 70–130)
GLUCOSE SERPL-MCNC: 151 MG/DL (ref 83–110)
POTASSIUM SERPL-SCNC: 3.9 MMOL/L (ref 3.5–5.1)
SODIUM SERPL-SCNC: 132 MMOL/L (ref 136–145)

## 2018-02-12 RX ADMIN — MULTIPLE VITAMINS W/ MINERALS TAB SCH TAB: TAB at 09:41

## 2018-02-12 RX ADMIN — INSULIN LISPRO PRN UNIT: 100 INJECTION, SOLUTION INTRAVENOUS; SUBCUTANEOUS at 12:15

## 2018-02-12 RX ADMIN — Medication SCH ML: at 21:30

## 2018-02-12 RX ADMIN — MOMETASONE FUROATE AND FORMOTEROL FUMARATE DIHYDRATE SCH PUFF: 200; 5 AEROSOL RESPIRATORY (INHALATION) at 18:18

## 2018-02-12 RX ADMIN — MOMETASONE FUROATE AND FORMOTEROL FUMARATE DIHYDRATE SCH PUFF: 200; 5 AEROSOL RESPIRATORY (INHALATION) at 06:25

## 2018-02-12 RX ADMIN — INSULIN LISPRO PRN UNIT: 100 INJECTION, SOLUTION INTRAVENOUS; SUBCUTANEOUS at 16:59

## 2018-02-12 RX ADMIN — ASPIRIN SCH MG: 81 TABLET ORAL at 09:40

## 2018-02-12 NOTE — PRG
DATE OF SERVICE:  02/12/2018

 

SUBJECTIVE:  Chai Santana says he is a little more dyspneic and short of breath than he was over 
the weekend although it is not terrible.

 

OBJECTIVE:

VITAL SIGNS:  He is afebrile, heart rate is in the 80s.  Blood pressure 102/69, oximetry is 90 on 4 l
iters cannula.

LUNGS:  Remarkable for decreased breath sounds at left base.  He has mild wheezes bilaterally.

CARDIOVASCULAR:  Regular rhythm.

ABDOMEN:  Soft.

 

IMPRESSION:

1.  Metastatic colon cancer.

2.  Resected non-small cell lung cancer.

 

Reviewed chest radiograph to grossly underpenetrated film.  It was interpreted as being consistent wi
th increase in interstitial markings, but it is a very underpenetrated film.

 

Pathology was signed out and was positive for adenocarcinoma.  This was not called to me.

 

In any event, we will probably end up putting a PleurX catheter in this gentleman.  He has not had hi
s new immunotherapy long enough to know whether or not he will respond.  Unfortunately, he has some a
ggressive colon cancer.

## 2018-02-12 NOTE — RAD
CHEST 1 VIEW:

 

HISTORY: 

Dyspnea.  Effusions.  Followup.

 

COMPARISON: 

2/7/18.

 

FINDINGS: 

Cardiac silhouette is magnified and partially obscured by a large amount of left pleural fluid.  Pulm
onary vasculature has become slightly more engorged with increase in patchy airspace opacity througho
ut each lung.  Mediastinum is midline with postoperative changes and aortic calcification.

 

IMPRESSION: 

Interval increase in pulmonary vascular congestion.  Left pleural fluid is otherwise stable.

 

POS: MATTHEW

## 2018-02-12 NOTE — PDOC.PN
- Subjective


Encounter Start Date: 02/12/18


Encounter Start Time: 14:21


Subjective: feels bad.worse SOB this morning.


-: weak,tired





- Objective


Resuscitation Status: 


 











Resuscitation Status           FULL:Full Resuscitation














MAR Reviewed: Yes


Vital Signs & Weight: 


 Vital Signs (12 hours)











  Temp Pulse Resp BP BP BP Pulse Ox


 


 02/12/18 11:46  98.2 F  89  18    118/63  89 L


 


 02/12/18 10:10   86  20     92 L


 


 02/12/18 10:00   88  20     90 L


 


 02/12/18 09:40   91     


 


 02/12/18 09:38     110/58 L   


 


 02/12/18 09:36   91  20   110/58 L   88 L


 


 02/12/18 08:00  97.3 F L  84  20     92 L


 


 02/12/18 07:20  97.3 F L  84  20    128/70  91 L


 


 02/12/18 06:23   87  22 H     94 L


 


 02/12/18 04:00  97.7 F  74  16    104/53 L  92 L








 Weight











Weight                         162 lb 14.4 oz














I&O: 


 











 02/11/18 02/12/18 02/13/18





 06:59 06:59 06:59


 


Intake Total 2280 725 


 


Output Total 800  


 


Balance 1480 725 











Result Diagrams: 


 02/11/18 05:31





 02/12/18 05:27


Additional Labs: 


 Accuchecks











  02/12/18 02/12/18 02/11/18





  11:44 05:51 21:16


 


POC Glucose  265 H  142 H  237 H














  02/11/18





  15:27


 


POC Glucose  357 H








Microbiology





02/08/18 17:30   Pleural fluid   Acid Fast Bacilli Smear - Final


02/08/18 17:30   Pleural fluid   Body Fluid Culture - Preliminary





 Laboratory Tests











  02/07/18 02/09/18 02/11/18





  20:10 04:37 05:31


 


Sodium  130 L  129 L  129 L














  02/12/18





  05:27


 


Sodium  132 L











Radiology Reviewed by me: Yes (CXR-Pulm vascular congestion)





Phys Exam





- Physical Examination


Constitutional: NAD


HEENT: PERRLA, moist MMs, sclera anicteric, TM's clear, oral pharynx no lesions

, 2+ tonsils


Neck: no nodes, no JVD, supple, full ROM


decreased at R lung.faint crackles B/L


Cardiovascular: RRR, no significant murmur


Gastrointestinal: soft, non-tender, no distention, positive bowel sounds


Musculoskeletal: no edema, pulses present


Neurological: non-focal, normal sensation, moves all 4 limbs


Psychiatric: normal affect, A&O x 3


Skin: no rash





Dx/Plan


(1) Fluid overload


Code(s): E87.70 - FLUID OVERLOAD, UNSPECIFIED   Status: Acute   


Qualifiers: 


   Hypervolemia type: unspecified   Qualified Code(s): E87.70 - Fluid overload, 

unspecified   





(2) Pleural effusion


Code(s): J90 - PLEURAL EFFUSION, NOT ELSEWHERE CLASSIFIED   Status: Acute   

Comment: s/p Thoracentesis   





(3) Hyponatremia


Code(s): E87.1 - HYPO-OSMOLALITY AND HYPONATREMIA   Status: Chronic   





(4) Respiratory distress


Code(s): R06.03 - ACUTE RESPIRATORY DISTRESS   Status: Acute   Comment: 

Metastatic Lung CA w Pleural effusion and Pulmonary edema   





(5) Anemia


Code(s): D64.9 - ANEMIA, UNSPECIFIED   Status: Chronic   


Qualifiers: 


   Anemia type: unspecified type   Qualified Code(s): D64.9 - Anemia, 

unspecified   


Comment: Recent Sx.Work up showed Anastomotic leak.   





(6) CAD (coronary artery disease)


Code(s): I25.10 - ATHSCL HEART DISEASE OF NATIVE CORONARY ARTERY W/O ANG PCTRS 

  Status: Chronic   


Qualifiers: 


   Coronary Disease-Associated Artery/Lesion type: native artery   Native vs. 

transplanted heart: native heart   Associated angina: without angina   

Qualified Code(s): I25.10 - Atherosclerotic heart disease of native coronary 

artery without angina pectoris   





(7) Chronic obstructive lung disease


Status: Chronic   





(8) Diabetes mellitus type 2 in nonobese


Code(s): E11.9 - TYPE 2 DIABETES MELLITUS WITHOUT COMPLICATIONS   Status: 

Chronic   





(9) Hypertension


Code(s): I10 - ESSENTIAL (PRIMARY) HYPERTENSION   Status: Chronic   


Qualifiers: 


   Hypertension type: essential hypertension   Qualified Code(s): I10 - 

Essential (primary) hypertension   





(10) H/O malignant neoplasm of colon


Code(s): Z85.038 - PERSONAL HISTORY OF MALIGNANT NEOPLASM OF LARGE INTESTINE   

Status: Chronic   Comment: s/p Colectomy 2017   





(11) Paroxysmal atrial fibrillation


Code(s): I48.0 - PAROXYSMAL ATRIAL FIBRILLATION   Status: Chronic   





- Plan


respiratory therapy, incentive spirometry, out of bed/ambulate, DVT proph w/SCDs


give 1 dose lasix.cont nebs,PO steroids


-: Pleural fluid cytology is pendinf.GS w/o any organisms.


-: may need Pleural catheter for recurrent pleural effusions.PCCM to decide


-: sodium better.deirdre TSAI d/u underlying Lung CA.


-: cont home meds as above.cont O2 prn.





* .not ready for DC yet


* am labs








Review of Systems





- Review of Systems


Constitutional: weakness, malaise.  negative: fever, chills, sweats, other


Respiratory: Cough, Shortness of Breath, SOB with Excertion


Cardiovascular: negative: chest pain, palpitations, orthopnea, paroxysmal 

nocturnal dyspnea, edema, light headedness, other


Gastrointestinal: negative: Nausea, Vomiting, Abdominal Pain, Diarrhea, 

Constipation, Melena, Hematochezia, Other


Genitourinary: negative: Dysuria, Frequency, Incontinence, Hematuria, Retention

, Other


Musculoskeletal: negative: Neck Pain, Shoulder Pain, Arm Pain, Back Pain, Hand 

Pain, Leg Pain, Foot Pain, Other


Neurological: negative: Weakness, Numbness, Incoordination, Change in Speech, 

Confusion, Seizures, Other





- Medications/Allergies


Allergies/Adverse Reactions: 


 Allergies











Allergy/AdvReac Type Severity Reaction Status Date / Time


 


Iodinated Contrast- Oral and Allergy   Verified 02/07/18 23:36





IV Dye     


 


iodine Allergy   Verified 02/07/18 23:36











Medications: 


 Current Medications





Albuterol/Ipratropium (Duoneb)  3 ml NEB [id number] [facility]


   Last Admin: 02/12/18 10:00 Dose:  3 ml


Albuterol/Ipratropium (Duoneb)  3 ml NEB Q2H PRN


   PRN Reason: SOB &/or Wheezing


   Last Admin: 02/08/18 13:01 Dose:  3 ml


Amlodipine Besylate (Norvasc)  2.5 mg PO QAM [facility]


   Last Admin: 02/12/18 09:40 Dose:  2.5 mg


Aspirin (Ecotrin)  81 mg PO QAM [facility]


   Last Admin: 02/12/18 09:40 Dose:  81 mg


Benzonatate (Tessalon)  100 mg PO TID PRN


   PRN Reason: Cough


   Last Admin: 02/12/18 09:43 Dose:  100 mg


Calcium Carbonate (Tums)  1,000 mg PO Q4H PRN


   PRN Reason: Heartburn  or Indigestion


Carvedilol (Coreg)  12.5 mg PO BID-WM [facility]


   Last Admin: 02/12/18 09:38 Dose:  Not Given


Cholecalciferol (Vitamin D3)  2,000 units PO DAILY [facility]


   Last Admin: 02/12/18 09:41 Dose:  2,000 units


Clonidine (Catapres)  0.1 mg PO Q4H PRN


   PRN Reason: Systolic BP > 180


Dextrose/Water (Dextrose 50%)  25 gm SLOW IVP PRN PRN


   PRN Reason: Hypoglycemia


Docusate Sodium (Colace)  100 mg PO BID [facility]


   Last Admin: 02/12/18 09:41 Dose:  100 mg


Famotidine (Pepcid)  20 mg PO BID [facility]


   Last Admin: 02/12/18 09:42 Dose:  20 mg


Glucagon (Glucagon)  1 mg IM PRN PRN


   PRN Reason: Hypoglycemia


Dextrose/Water (D5w)  1,000 mls @ 0 mls/hr IV .Q0M PRN; As Directed


   PRN Reason: Hypoglycemia


Insulin Human Lispro (Humalog)  0 units SC .MILD SLIDING SCALE PRN


   PRN Reason: Mild Correctional Scale


   Last Admin: 02/12/18 12:15 Dose:  4 unit


Iron/Minerals/Multivitamins (Theragran M)  1 tab PO DAILY [facility]


   Last Admin: 02/12/18 09:41 Dose:  1 tab


Lisinopril (Zestril)  5 mg PO HS [facility]


   Last Admin: 02/11/18 20:02 Dose:  5 mg


Loratadine (Claritin)  10 mg PO DAILY [facility]


   Last Admin: 02/12/18 09:39 Dose:  10 mg


Metformin HCl (Glucophage Xr)  500 mg PO BID [facility]


   Last Admin: 02/12/18 10:39 Dose:  500 mg


Mometasone Furoate/Formoterol Fumar (Dulera 200 Mcg/5 Mcg Inhaler)  2 puff INH 

BID-RT [facility]


   Last Admin: 02/12/18 06:25 Dose:  2 puff


Nitroglycerin (Nitrostat)  0.4 mg PO Q5MIN PRN


   PRN Reason: Chest Pain


Ondansetron HCl (Zofran Odt)  4 mg PO Q6H PRN


   PRN Reason: Nausea/Vomiting


Ondansetron HCl (Zofran)  4 mg IVP Q6H PRN


   PRN Reason: Nausea/Vomiting


Pioglitazone HCl (Actos)  30 mg PO QAM [facility]


   Last Admin: 02/12/18 09:42 Dose:  30 mg


Prednisone (Prednisone)  20 mg PO DAILY [facility]


   Last Admin: 02/12/18 09:42 Dose:  20 mg


Simvastatin (Zocor)  10 mg PO HS [facility]


   Last Admin: 02/11/18 20:01 Dose:  10 mg


Sodium Chloride (Sodium Chloride)  1 gm PO DAILY [facility]


   Last Admin: 02/12/18 09:39 Dose:  1 gm


Sodium Chloride (Flush - Normal Saline)  10 ml IVF Q12HR [facility]


Sodium Chloride (Flush - Normal Saline)  10 ml IVF PRN PRN


   PRN Reason: Saline Flush


Temazepam (Restoril)  15 mg PO HSPRN PRN


   PRN Reason: Anxiety


   Last Admin: 02/11/18 22:28 Dose:  15 mg

## 2018-02-13 LAB
ANION GAP SERPL CALC-SCNC: 11 MMOL/L (ref 10–20)
BUN SERPL-MCNC: 16 MG/DL (ref 8.4–25.7)
CALCIUM SERPL-MCNC: 9.2 MG/DL (ref 7.8–10.44)
CHLORIDE SERPL-SCNC: 96 MMOL/L (ref 98–107)
CO2 SERPL-SCNC: 32 MMOL/L (ref 23–31)
CREAT CL PREDICTED SERPL C-G-VRATE: 71 ML/MIN (ref 70–130)
GLUCOSE SERPL-MCNC: 177 MG/DL (ref 83–110)
POTASSIUM SERPL-SCNC: 4 MMOL/L (ref 3.5–5.1)
SODIUM SERPL-SCNC: 135 MMOL/L (ref 136–145)

## 2018-02-13 PROCEDURE — 0W9B30Z DRAINAGE OF LEFT PLEURAL CAVITY WITH DRAINAGE DEVICE, PERCUTANEOUS APPROACH: ICD-10-PCS | Performed by: THORACIC SURGERY (CARDIOTHORACIC VASCULAR SURGERY)

## 2018-02-13 RX ADMIN — INSULIN LISPRO PRN UNIT: 100 INJECTION, SOLUTION INTRAVENOUS; SUBCUTANEOUS at 18:36

## 2018-02-13 RX ADMIN — ASPIRIN SCH MG: 81 TABLET ORAL at 13:46

## 2018-02-13 RX ADMIN — MOMETASONE FUROATE AND FORMOTEROL FUMARATE DIHYDRATE SCH PUFF: 200; 5 AEROSOL RESPIRATORY (INHALATION) at 06:19

## 2018-02-13 RX ADMIN — MOMETASONE FUROATE AND FORMOTEROL FUMARATE DIHYDRATE SCH PUFF: 200; 5 AEROSOL RESPIRATORY (INHALATION) at 18:19

## 2018-02-13 RX ADMIN — INSULIN LISPRO PRN UNIT: 100 INJECTION, SOLUTION INTRAVENOUS; SUBCUTANEOUS at 13:42

## 2018-02-13 RX ADMIN — MULTIPLE VITAMINS W/ MINERALS TAB SCH TAB: TAB at 13:46

## 2018-02-13 RX ADMIN — Medication SCH ML: at 08:39

## 2018-02-13 RX ADMIN — Medication SCH ML: at 20:21

## 2018-02-13 NOTE — PDOC.PN
- Subjective


Encounter Start Date: 02/13/18


Encounter Start Time: 13:52


Subjective: feels much better now that he has Pleur-X catheter


-: SOB improved.wants to take O2 off





- Objective


Resuscitation Status: 


 











Resuscitation Status           FULL:Full Resuscitation














MAR Reviewed: Yes


Vital Signs & Weight: 


 Vital Signs (12 hours)











  Temp Pulse Resp BP BP Pulse Ox


 


 02/13/18 13:47   94   129/71  


 


 02/13/18 10:46   94  22 H    90 L


 


 02/13/18 08:38     129/71  


 


 02/13/18 08:00  97.9 F  94  22 H    91 L


 


 02/13/18 07:15  97.9 F  85  18   129/71  91 L


 


 02/13/18 06:16   85  20    91 L


 


 02/13/18 03:53  98.6 F  86  16   125/59 L  92 L








 Weight











Weight                         162 lb 1.6 oz














I&O: 


 











 02/12/18 02/13/18 02/14/18





 06:59 06:59 06:59


 


Intake Total 725 1460 


 


Balance 725 1460 











Result Diagrams: 


 02/11/18 05:31





 02/13/18 05:22


Additional Labs: 


 Accuchecks











  02/13/18 02/13/18 02/12/18





  11:21 05:36 19:42


 


POC Glucose  206 H  158 H  174 H














  02/12/18





  16:30


 


POC Glucose  314 H








Microbiology





02/08/18 17:30   Pleural fluid   Acid Fast Bacilli Smear - Final


02/08/18 17:30   Pleural fluid   Body Fluid Culture - Preliminary





 Laboratory Tests











  02/07/18 02/09/18 02/11/18





  20:10 04:37 05:31


 


Sodium  130 L  129 L  129 L














  02/12/18 02/13/18





  05:27 05:22


 


Sodium  132 L  135 L














Phys Exam





- Physical Examination


Constitutional: NAD


HEENT: PERRLA, moist MMs, sclera anicteric, oral pharynx no lesions


Neck: no nodes, no JVD, supple, full ROM


Respiratory: no wheezing, no rales, no rhonchi, clear to auscultation bilateral


Cardiovascular: RRR, no significant murmur, no rub, gallop


Gastrointestinal: soft, non-tender, no distention, positive bowel sounds


Musculoskeletal: no edema, pulses present


Neurological: non-focal, normal sensation, moves all 4 limbs


Psychiatric: normal affect, A&O x 3


Skin: no rash





Dx/Plan


(1) Fluid overload


Code(s): E87.70 - FLUID OVERLOAD, UNSPECIFIED   Status: Acute   


Qualifiers: 


   Hypervolemia type: unspecified   Qualified Code(s): E87.70 - Fluid overload, 

unspecified   





(2) Pleural effusion


Code(s): J90 - PLEURAL EFFUSION, NOT ELSEWHERE CLASSIFIED   Status: Acute   

Comment: s/p Thoracentesis   





(3) Hyponatremia


Code(s): E87.1 - HYPO-OSMOLALITY AND HYPONATREMIA   Status: Chronic   





(4) Respiratory distress


Code(s): R06.03 - ACUTE RESPIRATORY DISTRESS   Status: Resolved   Comment: 

Metastatic Lung CA w Pleural effusion and Pulmonary edema   





(5) Anemia


Code(s): D64.9 - ANEMIA, UNSPECIFIED   Status: Chronic   


Qualifiers: 


   Anemia type: unspecified type   Qualified Code(s): D64.9 - Anemia, 

unspecified   


Comment: Recent Sx.Work up showed Anastomotic leak.   





(6) CAD (coronary artery disease)


Code(s): I25.10 - ATHSCL HEART DISEASE OF NATIVE CORONARY ARTERY W/O ANG PCTRS 

  Status: Chronic   


Qualifiers: 


   Coronary Disease-Associated Artery/Lesion type: native artery   Native vs. 

transplanted heart: native heart   Associated angina: without angina   

Qualified Code(s): I25.10 - Atherosclerotic heart disease of native coronary 

artery without angina pectoris   





(7) Chronic obstructive lung disease


Status: Chronic   





(8) Diabetes mellitus type 2 in nonobese


Code(s): E11.9 - TYPE 2 DIABETES MELLITUS WITHOUT COMPLICATIONS   Status: 

Chronic   





(9) Hypertension


Code(s): I10 - ESSENTIAL (PRIMARY) HYPERTENSION   Status: Chronic   


Qualifiers: 


   Hypertension type: essential hypertension   Qualified Code(s): I10 - 

Essential (primary) hypertension   





(10) H/O malignant neoplasm of colon


Code(s): Z85.038 - PERSONAL HISTORY OF MALIGNANT NEOPLASM OF LARGE INTESTINE   

Status: Chronic   Comment: s/p Colectomy 2017   





(11) Paroxysmal atrial fibrillation


Code(s): I48.0 - PAROXYSMAL ATRIAL FIBRILLATION   Status: Chronic   





- Plan


plan discussed w/ family, PT/OT, , respiratory therapy, 

incentive spirometry, out of bed/ambulate, DVT proph w/SCDs


s/p Pleur-X and feeling much better.cont nebs,po steroids.


-: cont home meds as below.on ASA,BB,ACE-I.


-: likley home tomorrow if OK w [facility].appreciate input.


-: wean off o2 but he would benefit from O2 all the time.has home o2





* .








Review of Systems





- Review of Systems


Constitutional: negative: fever, chills, sweats, weakness, malaise, other


ENT: negative: Ear Pain, Ear Discharge, Nose Pain, Nose Discharge, Nose 

Congestion, Mouth Pain, Mouth Swelling, Throat Pain, Throat Swelling, Other


Respiratory: negative: Cough, Dry, Shortness of Breath, Hemoptysis, SOB with 

Excertion, Pleuritic Pain, Sputum, Wheezing


Cardiovascular: negative: chest pain, palpitations, orthopnea, paroxysmal 

nocturnal dyspnea, edema, light headedness, other


Gastrointestinal: negative: Nausea, Vomiting, Abdominal Pain, Diarrhea, 

Constipation, Melena, Hematochezia, Other


Genitourinary: negative: Dysuria, Frequency, Incontinence, Hematuria, Retention

, Other


Musculoskeletal: negative: Neck Pain, Shoulder Pain, Arm Pain, Back Pain, Hand 

Pain, Leg Pain, Foot Pain, Other


Neurological: negative: Weakness, Numbness, Incoordination, Change in Speech, 

Confusion, Seizures, Other





- Medications/Allergies


Allergies/Adverse Reactions: 


 Allergies











Allergy/AdvReac Type Severity Reaction Status Date / Time


 


Iodinated Contrast- Oral and Allergy   Verified 02/07/18 23:36





IV Dye     


 


iodine Allergy   Verified 02/07/18 23:36











Medications: 


 Current Medications





Hydrocodone Bitart/Acetaminophen (Norco 5/325)  1 tab PO Q4H PRN


   PRN Reason: Mild-Moderate Pain (1-5)


Hydrocodone Bitart/Acetaminophen (Norco 5/325)  2 tab PO Q4H PRN


   PRN Reason: Moderate to Severe Pain (6-10)


Albuterol/Ipratropium (Duoneb)  3 ml NEB [id number] [facility]


   Last Admin: 02/13/18 10:46 Dose:  3 ml


Albuterol/Ipratropium (Duoneb)  3 ml NEB Q2H PRN


   PRN Reason: SOB &/or Wheezing


   Last Admin: 02/08/18 13:01 Dose:  3 ml


Amlodipine Besylate (Norvasc)  2.5 mg PO [facility]


   Last Admin: 02/13/18 13:47 Dose:  2.5 mg


Aspirin (Ecotrin)  81 mg PO [facility]


   Last Admin: 02/13/18 13:46 Dose:  81 mg


Benzonatate (Tessalon)  100 mg PO TID PRN


   PRN Reason: Cough


   Last Admin: 02/12/18 22:23 Dose:  100 mg


Calcium Carbonate (Tums)  1,000 mg PO Q4H PRN


   PRN Reason: Heartburn  or Indigestion


Carvedilol (Coreg)  12.5 mg PO BID-WM [facility]


   Last Admin: 02/13/18 08:38 Dose:  12.5 mg


Cefazolin Sodium (Ancef)  2 gm SLOW IVP 0100,0900,1700 [facility]


   Stop: 02/13/18 23:55


Cholecalciferol (Vitamin D3)  2,000 units PO DAILY [facility]


   Last Admin: 02/13/18 13:46 Dose:  2,000 units


Clonidine (Catapres)  0.1 mg PO Q4H PRN


   PRN Reason: Systolic BP > 180


Dextrose/Water (Dextrose 50%)  25 gm SLOW IVP PRN PRN


   PRN Reason: Hypoglycemia


Docusate Sodium (Colace)  100 mg PO BID [facility]


   Last Admin: 02/13/18 13:45 Dose:  100 mg


Famotidine (Pepcid)  20 mg PO BID [facility]


   Last Admin: 02/13/18 13:49 Dose:  20 mg


Fentanyl (Sublimaze)  25 mcg SLOW IVP Q2H PRN


   PRN Reason: Severe Pain (7-10)


Glucagon (Glucagon)  1 mg IM PRN PRN


   PRN Reason: Hypoglycemia


Dextrose/Water (D5w)  1,000 mls @ 0 mls/hr IV .Q0M PRN; As Directed


   PRN Reason: Hypoglycemia


Insulin Human Lispro (Humalog)  0 units SC .MILD SLIDING SCALE PRN


   PRN Reason: Mild Correctional Scale


   Last Admin: 02/13/18 13:42 Dose:  3 unit


Iron/Minerals/Multivitamins (Theragran M)  1 tab PO DAILY [facility]


   Last Admin: 02/13/18 13:46 Dose:  1 tab


Lisinopril (Zestril)  5 mg PO HS [facility]


   Last Admin: 02/12/18 19:41 Dose:  5 mg


Loratadine (Claritin)  10 mg PO DAILY [facility]


   Last Admin: 02/13/18 13:47 Dose:  10 mg


Metformin HCl (Glucophage Xr)  500 mg PO BID [facility]


   Last Admin: 02/13/18 13:45 Dose:  500 mg


Mometasone Furoate/Formoterol Fumar (Dulera 200 Mcg/5 Mcg Inhaler)  2 puff INH 

BID-RT [facility]


   Last Admin: 02/13/18 06:19 Dose:  2 puff


Nitroglycerin (Nitrostat)  0.4 mg PO Q5MIN PRN


   PRN Reason: Chest Pain


Ondansetron HCl (Zofran Odt)  4 mg PO Q6H PRN


   PRN Reason: Nausea/Vomiting


Ondansetron HCl (Zofran)  4 mg IVP Q6H PRN


   PRN Reason: Nausea/Vomiting


Pioglitazone HCl (Actos)  30 mg PO QAM [facility]


   Last Admin: 02/12/18 09:42 Dose:  30 mg


Prednisone (Prednisone)  20 mg PO DAILY [facility]


   Last Admin: 02/13/18 13:49 Dose:  20 mg


Simvastatin (Zocor)  10 mg PO HS [facility]


   Last Admin: 02/12/18 19:42 Dose:  10 mg


Sodium Chloride (Sodium Chloride)  1 gm PO DAILY [facility]


   Last Admin: 02/13/18 13:45 Dose:  1 gm


Sodium Chloride (Flush - Normal Saline)  10 ml IVF Q12HR [facility]


   Last Admin: 02/13/18 08:39 Dose:  10 ml


Sodium Chloride (Flush - Normal Saline)  10 ml IVF PRN PRN


   PRN Reason: Saline Flush


Temazepam (Restoril)  15 mg PO HSPRN PRN


   PRN Reason: Anxiety


   Last Admin: 02/12/18 22:23 Dose:  15 mg

## 2018-02-13 NOTE — PRG
DATE OF SERVICE:  02/13/2018

 

SUBJECTIVE:  Mr. Santana underwent placement of his tunnel catheter today.  He has no complaints.

 

OBJECTIVE:

VITAL SIGNS:  He is afebrile, heart rate 87, blood pressure 106/66, respiratory rate 20, oximetry is 
93% on 4 liters.

LUNGS:  Remarkable for a coarse equal breath sounds.

CARDIOVASCULAR:  Regular rhythm.

ABDOMEN:  Soft.

 

IMPRESSION AND PLAN:

1.  Chronic obstructive pulmonary disease.

2.  Rapidly progressive metastatic colon cancer.  He has had 1 course of his cancer treatment.  He is
 due for second treatment in 2 weeks.

 

We instructed on drainage of his pleural space.  I have recommended that he not draining just on a ro
utine basis, but we would recommend draining this when he starts getting short of breath.  I have dis
cussed protein losses through pleural fluid.  Hopefully, with his chemotherapy ongoing, we will see a
 response with decreased evidence of pleural involvement and decreased requirement for pleural fluid 
drainage.

## 2018-02-13 NOTE — OP
PREOPERATIVE DIAGNOSIS:  Malignant left pleural effusion, recurrent.

 

PROCEDURE:  Left PleurX catheter.

 

SURGEON:  Dr. Reyes.

 

ANESTHESIA:  Sedation with local.

 

PROCEDURE IN DETAIL:  After prepping and draping, a 1% lidocaine was used to infiltrate the skin, and
 aspiration of pleural fluid was obtained.  A subcutaneous tract was then infiltrated with lidocaine 
and a separate incision was made from the exit site.  Following this, the catheter was placed through
 the tunnel, following which a dilator and Peel-Away sheath were placed over the wire and the cathete
r advanced through the Peel-Away sheath.  After this, the sheath had been removed and the catheter li
e nicely in the subcutaneous plane.  Following this, the wounds were closed and 1800 mL of yellow flu
id was aspirated from the pleural space.  The patient tolerated the procedure well.

## 2018-02-13 NOTE — RAD
ONE VIEW CHEST:

 

History: Catheter placement. 

 

Comparison: 2-12-18

 

FINDINGS: 

Portable upright chest re-demonstrates sternotomy wires. Stable cardiac silhouette. Interval placemen
t of a left sided catheter with the distal tip in the left lung base. Previously seen left sided pleu
ral effusion has resolved. Persistent diffuse interstitial and alveolar opacities. Focal mass-like op
acity in the right lung base and right midlung are noted. Correlation made with a chest CT from 1-22-
18 does demonstrate multiple lung parenchymal masses. No pneumothorax. 

 

IMPRESSION: 

1. Minimal decrease in left sided pleural effusion, secondary to catheter placement. 

2. No pneumothorax. 

 

POS: [facility]

## 2018-02-14 VITALS — SYSTOLIC BLOOD PRESSURE: 108 MMHG | DIASTOLIC BLOOD PRESSURE: 70 MMHG | TEMPERATURE: 97.4 F

## 2018-02-14 RX ADMIN — MULTIPLE VITAMINS W/ MINERALS TAB SCH TAB: TAB at 09:13

## 2018-02-14 RX ADMIN — ASPIRIN SCH MG: 81 TABLET ORAL at 09:15

## 2018-02-14 RX ADMIN — MOMETASONE FUROATE AND FORMOTEROL FUMARATE DIHYDRATE SCH PUFF: 200; 5 AEROSOL RESPIRATORY (INHALATION) at 08:05

## 2018-02-14 RX ADMIN — Medication SCH ML: at 09:12

## 2018-02-14 NOTE — PDOC.PN
- Subjective


Encounter Start Date: 02/14/18


Encounter Start Time: 12:21


Subjective: feels short of breath





- Objective


Resuscitation Status: 


 











Resuscitation Status           FULL:Full Resuscitation














MAR Reviewed: Yes


Vital Signs & Weight: 


 Vital Signs (12 hours)











  Temp Pulse Resp BP BP BP Pulse Ox


 


 02/14/18 11:10  97.7 F  81  18    125/71  92 L


 


 02/14/18 10:32     121/72   


 


 02/14/18 09:15   85   121/72   


 


 02/14/18 08:05   84  12    


 


 02/14/18 08:00  98.7 F  85  18     95


 


 02/14/18 07:47        92 L


 


 02/14/18 07:46   84  12    


 


 02/14/18 07:10  98.7 F  85  18    121/72  95


 


 02/14/18 04:00  97.5 F L  84  18   118/65   94 L


 


 02/14/18 01:34   83  20     92 L








 Weight











Weight                         159 lb 9.6 oz














I&O: 


 











 02/13/18 02/14/18 02/15/18





 06:59 06:59 06:59


 


Intake Total 1460 1480 


 


Balance 1460 1480 











Result Diagrams: 


 02/11/18 05:31





 02/13/18 05:22


Additional Labs: 


 Accuchecks











  02/14/18 02/14/18 02/13/18





  11:33 05:40 21:20


 


POC Glucose  205 H  149 H  243 H














  02/13/18





  15:34


 


POC Glucose  275 H








Microbiology





02/08/18 17:30   Pleural fluid   Body Fluid Culture - Final


02/08/18 17:30   Pleural fluid   Acid Fast Bacilli Smear - Final








Radiology Reviewed by me: Yes





Phys Exam





- Physical Examination


Constitutional: NAD


HEENT: PERRLA, moist MMs, sclera anicteric, TM's clear, oral pharynx no lesions

, 2+ tonsils


Neck: no nodes, no JVD, supple, full ROM


Respiratory: no wheezing, no rales, no rhonchi, clear to auscultation bilateral


decreased at bases


Cardiovascular: RRR, no significant murmur


Gastrointestinal: soft, non-tender, no distention, positive bowel sounds


Musculoskeletal: no edema, pulses present


Neurological: non-focal, normal sensation, moves all 4 limbs





Dx/Plan


(1) Fluid overload


Code(s): E87.70 - FLUID OVERLOAD, UNSPECIFIED   Status: Resolved   


Qualifiers: 


   Hypervolemia type: unspecified   Qualified Code(s): E87.70 - Fluid overload, 

unspecified   





(2) Pleural effusion


Code(s): J90 - PLEURAL EFFUSION, NOT ELSEWHERE CLASSIFIED   Status: Acute   

Comment: s/p Thoracentesis.malignant cells in Fluid   





(3) Hyponatremia


Code(s): E87.1 - HYPO-OSMOLALITY AND HYPONATREMIA   Status: Chronic   





(4) Respiratory distress


Code(s): R06.03 - ACUTE RESPIRATORY DISTRESS   Status: Resolved   Comment: 

Metastatic Lung CA w Pleural effusion and Pulmonary edema   





(5) Anemia


Code(s): D64.9 - ANEMIA, UNSPECIFIED   Status: Chronic   


Qualifiers: 


   Anemia type: unspecified type   Qualified Code(s): D64.9 - Anemia, 

unspecified   


Comment: Recent Sx.Work up showed Anastomotic leak.   





(6) CAD (coronary artery disease)


Code(s): I25.10 - ATHSCL HEART DISEASE OF NATIVE CORONARY ARTERY W/O ANG PCTRS 

  Status: Chronic   


Qualifiers: 


   Coronary Disease-Associated Artery/Lesion type: native artery   Native vs. 

transplanted heart: native heart   Associated angina: without angina   

Qualified Code(s): I25.10 - Atherosclerotic heart disease of native coronary 

artery without angina pectoris   





(7) Chronic obstructive lung disease


Status: Chronic   





(8) Diabetes mellitus type 2 in nonobese


Code(s): E11.9 - TYPE 2 DIABETES MELLITUS WITHOUT COMPLICATIONS   Status: 

Chronic   





(9) Hypertension


Code(s): I10 - ESSENTIAL (PRIMARY) HYPERTENSION   Status: Chronic   


Qualifiers: 


   Hypertension type: essential hypertension   Qualified Code(s): I10 - 

Essential (primary) hypertension   





(10) H/O malignant neoplasm of colon


Code(s): Z85.038 - PERSONAL HISTORY OF MALIGNANT NEOPLASM OF LARGE INTESTINE   

Status: Chronic   Comment: s/p Colectomy 2017   





(11) Paroxysmal atrial fibrillation


Code(s): I48.0 - PAROXYSMAL ATRIAL FIBRILLATION   Status: Chronic   





- Plan


PT/OT, out of bed/ambulate, DVT proph w/SCDs


s/p pleur-x catheter .teaching done as to how & when to drian


-: c/o SOB and wants to stay in house tonight for monitoring of symptoms


-: appreciate PCCM input.


-: pt notified of Pleural fluid path reports


-: o/w hemodynamically stable.





* .Home meds as below.


* DC home done if feeling better by evening ,but pt somewhat reluctant 








Review of Systems





- Review of Systems


Constitutional: negative: fever, chills, sweats, weakness, malaise, other


ENT: negative: Ear Pain, Ear Discharge, Nose Pain, Nose Discharge, Nose 

Congestion, Mouth Pain, Mouth Swelling, Throat Pain, Throat Swelling, Other


Respiratory: SOB with Excertion.  negative: Cough, Dry, Shortness of Breath, 

Hemoptysis, Pleuritic Pain, Sputum, Wheezing


Cardiovascular: negative: chest pain, palpitations, orthopnea, paroxysmal 

nocturnal dyspnea, edema, light headedness, other


Gastrointestinal: negative: Nausea, Vomiting, Abdominal Pain, Diarrhea, 

Constipation, Melena, Hematochezia, Other


Genitourinary: negative: Dysuria, Frequency, Incontinence, Hematuria, Retention

, Other


Musculoskeletal: negative: Neck Pain, Shoulder Pain, Arm Pain, Back Pain, Hand 

Pain, Leg Pain, Foot Pain, Other


Skin: negative: Rash, Lesions, Josiah, Bruising, Other


Neurological: negative: Weakness, Numbness, Incoordination, Change in Speech, 

Confusion, Seizures, Other





- Medications/Allergies


Allergies/Adverse Reactions: 


 Allergies











Allergy/AdvReac Type Severity Reaction Status Date / Time


 


Iodinated Contrast- Oral and Allergy   Verified 02/07/18 23:36





IV Dye     


 


iodine Allergy   Verified 02/07/18 23:36











Medications: 


 Current Medications





Hydrocodone Bitart/Acetaminophen (Norco 5/325)  1 tab PO Q4H PRN


   PRN Reason: Mild-Moderate Pain (1-5)


Hydrocodone Bitart/Acetaminophen (Norco 5/325)  2 tab PO Q4H PRN


   PRN Reason: Moderate to Severe Pain (6-10)


Albuterol/Ipratropium (Duoneb)  3 ml NEB [id number] [facility]


   Last Admin: 02/14/18 11:10 Dose:  3 ml


Albuterol/Ipratropium (Duoneb)  3 ml NEB Q2H PRN


   PRN Reason: SOB &/or Wheezing


   Last Admin: 02/08/18 13:01 Dose:  3 ml


Amlodipine Besylate (Norvasc)  2.5 mg PO [facility]


   Last Admin: 02/14/18 09:15 Dose:  2.5 mg


Aspirin (Ecotrin)  81 mg PO [facility]


   Last Admin: 02/14/18 09:15 Dose:  81 mg


Benzonatate (Tessalon)  100 mg PO TID PRN


   PRN Reason: Cough


   Last Admin: 02/13/18 22:03 Dose:  100 mg


Calcium Carbonate (Tums)  1,000 mg PO Q4H PRN


   PRN Reason: Heartburn  or Indigestion


Carvedilol (Coreg)  12.5 mg PO BID-WM [facility]


   Last Admin: 02/14/18 10:32 Dose:  12.5 mg


Cholecalciferol (Vitamin D3)  2,000 units PO DAILY [facility]


   Last Admin: 02/14/18 09:13 Dose:  2,000 units


Clonidine (Catapres)  0.1 mg PO Q4H PRN


   PRN Reason: Systolic BP > 180


Dextrose/Water (Dextrose 50%)  25 gm SLOW IVP PRN PRN


   PRN Reason: Hypoglycemia


Docusate Sodium (Colace)  100 mg PO BID [facility]


   Last Admin: 02/14/18 09:14 Dose:  100 mg


Famotidine (Pepcid)  20 mg PO BID [facility]


   Last Admin: 02/14/18 09:14 Dose:  20 mg


Fentanyl (Sublimaze)  25 mcg SLOW IVP Q2H PRN


   PRN Reason: Severe Pain (7-10)


Glucagon (Glucagon)  1 mg IM PRN PRN


   PRN Reason: Hypoglycemia


Dextrose/Water (D5w)  1,000 mls @ 0 mls/hr IV .Q0M PRN; As Directed


   PRN Reason: Hypoglycemia


Insulin Human Lispro (Humalog)  0 units SC .MILD SLIDING SCALE PRN


   PRN Reason: Mild Correctional Scale


   Last Admin: 02/13/18 18:36 Dose:  4 unit


Iron/Minerals/Multivitamins (Theragran M)  1 tab PO DAILY [facility]


   Last Admin: 02/14/18 09:13 Dose:  1 tab


Lisinopril (Zestril)  5 mg PO HS [facility]


   Last Admin: 02/13/18 20:20 Dose:  5 mg


Loratadine (Claritin)  10 mg PO DAILY [facility]


   Last Admin: 02/14/18 09:14 Dose:  10 mg


Metformin HCl (Glucophage Xr)  500 mg PO BID [facility]


   Last Admin: 02/14/18 09:13 Dose:  500 mg


Mometasone Furoate/Formoterol Fumar (Dulera 200 Mcg/5 Mcg Inhaler)  2 puff INH 

BID-RT [facility]


   Last Admin: 02/14/18 08:05 Dose:  2 puff


Nitroglycerin (Nitrostat)  0.4 mg PO Q5MIN PRN


   PRN Reason: Chest Pain


Ondansetron HCl (Zofran Odt)  4 mg PO Q6H PRN


   PRN Reason: Nausea/Vomiting


Ondansetron HCl (Zofran)  4 mg IVP Q6H PRN


   PRN Reason: Nausea/Vomiting


Pioglitazone HCl (Actos)  30 mg PO QAM [facility]


   Last Admin: 02/14/18 09:12 Dose:  30 mg


Prednisone (Prednisone)  20 mg PO DAILY [facility]


   Last Admin: 02/14/18 09:15 Dose:  20 mg


Simvastatin (Zocor)  10 mg PO HS [facility]


   Last Admin: 02/13/18 20:19 Dose:  10 mg


Sodium Chloride (Sodium Chloride)  1 gm PO DAILY [facility]


   Last Admin: 02/14/18 09:14 Dose:  1 gm


Sodium Chloride (Flush - Normal Saline)  10 ml IVF Q12HR [facility]


   Last Admin: 02/14/18 09:12 Dose:  10 ml


Sodium Chloride (Flush - Normal Saline)  10 ml IVF PRN PRN


   PRN Reason: Saline Flush


Temazepam (Restoril)  15 mg PO HSPRN PRN


   PRN Reason: Anxiety


   Last Admin: 02/13/18 22:03 Dose:  15 mg

## 2018-02-14 NOTE — PRG
DATE OF SERVICE:  02/14/2018

 

Chai Santana says he feels about the same as yesterday.  His heart rate is in the 80s.  He is afe
brile, respiratory rate is 12, oximetry is 92 on 4 liters, blood pressure 121/72.  He is still receiv
ing his nebulizer treatments.  He has equal breath sounds.  Heart, regular rhythm.

Abdomen is soft.  He is still receiving his nebulizer treatments and his prednisone.

 

We recommend he stay on 20 mg of prednisone after he leaves.  Continue nebulizer treatments every 4 h
ours.  He has been taught today how to use his PleurX catheter.

## 2018-02-15 NOTE — DIS
DATE OF ADMISSION:  02/07/2018

 

DATE OF DISCHARGE:  02/14/2018

 

CONDITION AT THE TIME OF DISCHARGE:  Stable and improved.

 

DISCHARGE DIAGNOSES:

1.  Recurrent pleural effusion secondary to adenocarcinoma of the lung, status post thoracentesis.

2.  Fluid overload, resolved with IV Lasix.

3.  Hyponatremia secondary to chronic lung disease.

4.  Acute respiratory distress, resolved.

5.  Chronic anemia.

6.  Coronary artery disease.

7.  Chronic obstructive pulmonary disease.

8.  Diabetes mellitus, type 2.

9.  Hypertension.

10.  History of colon cancer, status post colectomy in 2017.

11.  History of paroxysmal atrial fibrillation.

 

DISCHARGE MEDICATIONS:  Prednisone 20 mg daily, Zyrtec 10 mg daily, Coreg 12.5 mg p.o. b.i.d., aspiri
n 81 mg daily, Norvasc 2.5 mg daily, Restoril 15 mg at bedtime as needed, simvastatin 10 mg daily, Ac
tos 2 tablets daily, multivitamin daily, lisinopril 5 mg daily, DuoNeb every 2 hours as needed, vitam
in D3 daily, glucosamine daily, Incruse Ellipta one inhalation daily, metformin 500 mg p.o. b.i.d.

 

PRIMARY CARE PHYSICIAN:  Cali Conde M.D.

 

CONSULTATIONS IN-HOUSE: Include Pulmonary Critical Care Medicine doctors, Dr. Manrique, Dr. Kovacs and BENITA Desai.

 

PROCEDURES DONE IN THE HOSPITAL:  Include:

1.  Chest x-ray upon presentation, which shows bilateral patchy alveolar infiltrates.

2.  Lower extremity ultrasound, which is negative for any evidence of DVT on either legs.

3.  Thoracentesis of the left pleural space.

4.  Placement of PleurX catheter for recurrent left-sided pleural effusion by Dr. Reyes.

 

HISTORY OF PRESENTING ILLNESS:  Mr. Santana is a pleasant 77-year-old  male with history o
f COPD, chronic respiratory failure on home oxygen, nonsmall cell lung carcinoma and history of colon
 cancer, who presented to the emergency room with complaints of shortness of breath of 3-4 day durati
on.  He also had cough and wheezing, complains upon presentation and leg swelling.  In the ER, his 
est x-ray was consistent with left-sided pleural effusion, which has increased since the last exam ea
ier this year.  He also had bilateral patchy alveolar infiltrates.  He was admitted with acute hypo
xic respiratory failure and pulmonary Medicine was consulted.  [facility]

 

HOSPITAL COURSE:  The patient was continued on nebulizers and Dr. Manrique initially saw the patient. 
 He recommended a thoracentesis for recurrent pleural effusion, which was done.  Eventually the patho
logy came back as adenocarcinoma consistent with lung primary.  Recently the patient was told that he
 has worsening of his metastatic adenocarcinoma of the lungs.  Dr. Kovacs and Dr. Desai also continued 
to see the patient, but despite the thoracentesis, the patient had reaccumulation of the fluid and wo
rsening of his respiratory distress.  Lasix was used with minimal effect.  Eventually, the decision w
as made that the PleurX catheter will be beneficial for this patient and this was done yesterday on 0
2/13/2018.  He had significant improvement in his symptoms with that.  He was taught how to use it an
d drain it and as of this morning he is back to his baseline.  He is cleared for discharge today.  He
 will follow up with his primary care physician and pulmonologist as an outpatient.  Please see hospi
brannonist progress note from today's date for further detail including the face-to-face interaction.

 

Total time spent in the discharge 32 minutes.

## 2018-03-08 ENCOUNTER — HOSPITAL ENCOUNTER (OUTPATIENT)
Dept: HOSPITAL 92 - RAD | Age: 78
Discharge: HOME | End: 2018-03-08
Attending: THORACIC SURGERY (CARDIOTHORACIC VASCULAR SURGERY)
Payer: MEDICARE

## 2018-03-08 DIAGNOSIS — J81.1: ICD-10-CM

## 2018-03-08 DIAGNOSIS — C34.32: Primary | ICD-10-CM

## 2018-03-08 PROCEDURE — 71046 X-RAY EXAM CHEST 2 VIEWS: CPT

## 2018-03-08 NOTE — RAD
CHEST 2 VIEWS:

 

HISTORY: 

Lung cancer.

 

COMPARISON: 

2/15/18.

 

FINDINGS: 

Cardiac silhouette remains enlarged.  Pulmonary vasculature is engorged.  Widespread reticulonodular 
interstitial prominence and patchy areas of parenchymal opacity are similar in appearance to the prev
ious exam.  Right pleural fluid is now more apparent.  Left thoracostomy tube remains in place.  Medi
astinum is midline with postoperative changes and aortic calcification.  Pleural thickening along the
 left lateral chest wall is similar in appearance to the prior study.

 

IMPRESSION: 

1.  Interval increase right pleural fluid, small amount.

 

2.  Left pleural fluid, chest tube, and other findings are otherwise stable.

 

POS: MATTHEW

## 2018-03-10 ENCOUNTER — HOSPITAL ENCOUNTER (INPATIENT)
Dept: HOSPITAL 92 - SCSER | Age: 78
LOS: 10 days | DRG: 870 | End: 2018-03-20
Attending: INTERNAL MEDICINE | Admitting: INTERNAL MEDICINE
Payer: MEDICARE

## 2018-03-10 VITALS — BODY MASS INDEX: 31.9 KG/M2

## 2018-03-10 DIAGNOSIS — D53.9: ICD-10-CM

## 2018-03-10 DIAGNOSIS — Z51.5: ICD-10-CM

## 2018-03-10 DIAGNOSIS — Z87.891: ICD-10-CM

## 2018-03-10 DIAGNOSIS — J81.1: ICD-10-CM
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
## 2018-03-10 DIAGNOSIS — I48.0: ICD-10-CM

## 2018-03-10 DIAGNOSIS — I11.0: ICD-10-CM

## 2018-03-10 DIAGNOSIS — Z99.81: ICD-10-CM

## 2018-03-10 DIAGNOSIS — I50.42: ICD-10-CM

## 2018-03-10 DIAGNOSIS — Z66: ICD-10-CM

## 2018-03-10 DIAGNOSIS — I47.1: ICD-10-CM

## 2018-03-10 DIAGNOSIS — E11.9: ICD-10-CM

## 2018-03-10 DIAGNOSIS — E88.09: ICD-10-CM

## 2018-03-10 DIAGNOSIS — R65.20: ICD-10-CM

## 2018-03-10 DIAGNOSIS — E87.5: ICD-10-CM

## 2018-03-10 DIAGNOSIS — I25.10: ICD-10-CM

## 2018-03-10 DIAGNOSIS — J44.1: ICD-10-CM

## 2018-03-10 DIAGNOSIS — C79.9: ICD-10-CM

## 2018-03-10 DIAGNOSIS — Z85.038: ICD-10-CM

## 2018-03-10 DIAGNOSIS — E87.1: ICD-10-CM

## 2018-03-10 DIAGNOSIS — J44.0: ICD-10-CM

## 2018-03-10 DIAGNOSIS — E66.9: ICD-10-CM

## 2018-03-10 DIAGNOSIS — K56.7: ICD-10-CM

## 2018-03-10 DIAGNOSIS — J15.6: ICD-10-CM

## 2018-03-10 DIAGNOSIS — E78.5: ICD-10-CM

## 2018-03-10 DIAGNOSIS — C34.90: ICD-10-CM

## 2018-03-10 DIAGNOSIS — E55.9: ICD-10-CM

## 2018-03-10 DIAGNOSIS — A41.9: Primary | ICD-10-CM

## 2018-03-10 DIAGNOSIS — Z95.1: ICD-10-CM

## 2018-03-10 DIAGNOSIS — C34.32: ICD-10-CM

## 2018-03-10 DIAGNOSIS — J96.21: ICD-10-CM

## 2018-03-10 DIAGNOSIS — T46.4X5A: ICD-10-CM

## 2018-03-10 DIAGNOSIS — J91.0: ICD-10-CM

## 2018-03-10 LAB
ALBUMIN SERPL BCG-MCNC: 2.8 G/DL (ref 3.4–4.8)
ALP SERPL-CCNC: 54 U/L (ref 40–150)
ALT SERPL W P-5'-P-CCNC: 13 U/L (ref 8–55)
ANION GAP SERPL CALC-SCNC: 15 MMOL/L (ref 10–20)
AST SERPL-CCNC: 8 U/L (ref 5–34)
BASOPHILS # BLD AUTO: 0.3 THOU/UL (ref 0–0.2)
BASOPHILS NFR BLD AUTO: 1.7 % (ref 0–1)
BILIRUB SERPL-MCNC: 0.2 MG/DL (ref 0.2–1.2)
BUN SERPL-MCNC: 39 MG/DL (ref 8.4–25.7)
CALCIUM SERPL-MCNC: 9.5 MG/DL (ref 7.8–10.44)
CHLORIDE SERPL-SCNC: 97 MMOL/L (ref 98–107)
CK MB SERPL-MCNC: 0.8 NG/ML (ref 0–6.6)
CO2 SERPL-SCNC: 28 MMOL/L (ref 23–31)
CREAT CL PREDICTED SERPL C-G-VRATE: 0 ML/MIN (ref 70–130)
CRYSTAL-AUWI FLAG: 0 (ref 0–15)
EOSINOPHIL # BLD AUTO: 0.5 THOU/UL (ref 0–0.7)
EOSINOPHIL NFR BLD AUTO: 3.4 % (ref 0–10)
GLOBULIN SER CALC-MCNC: 2.2 G/DL (ref 2.4–3.5)
GLUCOSE SERPL-MCNC: 270 MG/DL (ref 83–110)
GLUCOSE UR STRIP-MCNC: 100 MG/DL
HEV IGM SER QL: 0.5 (ref 0–7.99)
HGB BLD-MCNC: 14.3 G/DL (ref 14–18)
HYALINE CASTS #/AREA URNS LPF: (no result) LPF
LIPASE SERPL-CCNC: 12 U/L (ref 8–78)
LYMPHOCYTES # BLD: 0.6 THOU/UL (ref 1.2–3.4)
LYMPHOCYTES NFR BLD AUTO: 3.7 % (ref 21–51)
MCH RBC QN AUTO: 32.2 PG (ref 27–31)
MCV RBC AUTO: 97.1 FL (ref 80–94)
MONOCYTES # BLD AUTO: 0.8 THOU/UL (ref 0.11–0.59)
MONOCYTES NFR BLD AUTO: 5.2 % (ref 0–10)
NEUTROPHILS # BLD AUTO: 12.9 THOU/UL (ref 1.4–6.5)
NEUTROPHILS NFR BLD AUTO: 86.1 % (ref 42–75)
PATHC CAST-AUWI FLAG: 0.27 (ref 0–2.49)
PLATELET # BLD AUTO: 300 THOU/UL (ref 130–400)
POTASSIUM SERPL-SCNC: 6.3 MMOL/L (ref 3.5–5.1)
RBC # BLD AUTO: 4.46 MILL/UL (ref 4.7–6.1)
RBC UR QL AUTO: (no result) HPF (ref 0–3)
SODIUM SERPL-SCNC: 134 MMOL/L (ref 136–145)
SP GR UR STRIP: 1.02 (ref 1–1.04)
SPERM-AUWI FLAG: 0 (ref 0–9.9)
TROPONIN I SERPL DL<=0.01 NG/ML-MCNC: 0.01 NG/ML (ref ?–0.03)
WBC # BLD AUTO: 14.9 THOU/UL (ref 4.8–10.8)
YEAST-AUWI FLAG: 0 (ref 0–25)

## 2018-03-10 PROCEDURE — 93010 ELECTROCARDIOGRAM REPORT: CPT

## 2018-03-10 PROCEDURE — 71045 X-RAY EXAM CHEST 1 VIEW: CPT

## 2018-03-10 PROCEDURE — 87205 SMEAR GRAM STAIN: CPT

## 2018-03-10 PROCEDURE — 94640 AIRWAY INHALATION TREATMENT: CPT

## 2018-03-10 PROCEDURE — 87070 CULTURE OTHR SPECIMN AEROBIC: CPT

## 2018-03-10 PROCEDURE — 71046 X-RAY EXAM CHEST 2 VIEWS: CPT

## 2018-03-10 PROCEDURE — 80053 COMPREHEN METABOLIC PANEL: CPT

## 2018-03-10 PROCEDURE — P9045 ALBUMIN (HUMAN), 5%, 250 ML: HCPCS

## 2018-03-10 PROCEDURE — 94003 VENT MGMT INPAT SUBQ DAY: CPT

## 2018-03-10 PROCEDURE — 94760 N-INVAS EAR/PLS OXIMETRY 1: CPT

## 2018-03-10 PROCEDURE — 96365 THER/PROPH/DIAG IV INF INIT: CPT

## 2018-03-10 PROCEDURE — 36415 COLL VENOUS BLD VENIPUNCTURE: CPT

## 2018-03-10 PROCEDURE — 87186 SC STD MICRODIL/AGAR DIL: CPT

## 2018-03-10 PROCEDURE — 5A09457 ASSISTANCE WITH RESPIRATORY VENTILATION, 24-96 CONSECUTIVE HOURS, CONTINUOUS POSITIVE AIRWAY PRESSURE: ICD-10-PCS | Performed by: EMERGENCY MEDICINE

## 2018-03-10 PROCEDURE — 85025 COMPLETE CBC W/AUTO DIFF WBC: CPT

## 2018-03-10 PROCEDURE — A4216 STERILE WATER/SALINE, 10 ML: HCPCS

## 2018-03-10 PROCEDURE — 84100 ASSAY OF PHOSPHORUS: CPT

## 2018-03-10 PROCEDURE — 94660 CPAP INITIATION&MGMT: CPT

## 2018-03-10 PROCEDURE — 93005 ELECTROCARDIOGRAM TRACING: CPT

## 2018-03-10 PROCEDURE — 94002 VENT MGMT INPAT INIT DAY: CPT

## 2018-03-10 PROCEDURE — 96375 TX/PRO/DX INJ NEW DRUG ADDON: CPT

## 2018-03-10 PROCEDURE — 87040 BLOOD CULTURE FOR BACTERIA: CPT

## 2018-03-10 PROCEDURE — 36416 COLLJ CAPILLARY BLOOD SPEC: CPT

## 2018-03-10 PROCEDURE — 83880 ASSAY OF NATRIURETIC PEPTIDE: CPT

## 2018-03-10 PROCEDURE — 82553 CREATINE MB FRACTION: CPT

## 2018-03-10 PROCEDURE — 82805 BLOOD GASES W/O2 SATURATION: CPT

## 2018-03-10 PROCEDURE — 96367 TX/PROPH/DG ADDL SEQ IV INF: CPT

## 2018-03-10 PROCEDURE — 83690 ASSAY OF LIPASE: CPT

## 2018-03-10 PROCEDURE — 83735 ASSAY OF MAGNESIUM: CPT

## 2018-03-10 PROCEDURE — 87077 CULTURE AEROBIC IDENTIFY: CPT

## 2018-03-10 PROCEDURE — 81001 URINALYSIS AUTO W/SCOPE: CPT

## 2018-03-10 PROCEDURE — 84484 ASSAY OF TROPONIN QUANT: CPT

## 2018-03-10 PROCEDURE — 80048 BASIC METABOLIC PNL TOTAL CA: CPT

## 2018-03-10 RX ADMIN — INSULIN LISPRO PRN UNIT: 100 INJECTION, SOLUTION INTRAVENOUS; SUBCUTANEOUS at 22:04

## 2018-03-10 RX ADMIN — CEFEPIME HYDROCHLORIDE SCH MLS: 2 INJECTION, POWDER, FOR SOLUTION INTRAVENOUS at 16:56

## 2018-03-10 NOTE — RAD
CHEST 1 VIEW PORTABLE:

 

Date:  03/10/18 

 

HISTORY:  

Lung cancer. Dyspnea. 

 

COMPARISON:  

03/08/18. 

 

FINDINGS:

Heart size is enlarged. There are areas of nodularity in the right lower lobe and mid lung field, and
 also some changes in the left mid lung field. Also, pleural changes in the left lung and some minima
l blunting to the right costophrenic angle. Given the differences in technique, there appears to be f
airly minimal interval change. There is slight increased obscuration to the left heart border, but pa
rt of this may be technique related rather than any definite acute process. PA and lateral chest film
 may be helpful in further assessment. 

 

IMPRESSION: 

1.  Cardiomegaly with postop sternotomy change. 

2.  Nodular mass-like areas in the right lung base and right mid lung field. There is obscuration cj
ng the left heart border with a left-sided chest tube in place, with left-sided effusion. Slight incr
eased obscuration to the left heart border is noted, some of which may be technique related. 

 

 

POS: MATTHEW

## 2018-03-10 NOTE — HP
PRIMARY CARE PHYSICIAN:  Cali Conde M.D.

 

PRIMARY PULMONOLOGIST:  Dr. Desai.

 

PRIMARY ONCOLOGIST:  Dr. Power.

 

REASON FOR ADMISSION:  Increasing shortness of breath.

 

HISTORY OF PRESENT ILLNESS:  A 77-year-old male who has underlying history of 
chronic respiratory failure and he is using home oxygen.  Despite that, he was 
feeling that he is not getting enough oxygen and he was having increasing 
shortness of breath for the last couple of days.  Patient was feeling more 
shortness of breath this morning when he was accidentally discontinued his 
oxygen when his wife changed the humidifier on it and this was not discovered 
up until 1 hour prior to going to emergency room when they were changing oxygen 
tank.  Patient started using oxygen, but his shortness of breath did not 
improve and that is why they decided to go to emergency room.  When he came to 
ER, he was saturating only 61% on room air.  He was tachypneic and he was also 
hypotensive.  His chest x-ray showed pleural effusion and he reports that he 
drained pleural fluid this morning.  He was recently admitted in our hospital 
on 02/08/2018 and he was discharged on 02/14/2018.  He had PleurX catheter 
placed for malignant left pleural effusion by Dr. Reyes.  Patient about to see 
him on coming Thursday.  The patient is unable to empty pleural fluid and that 
he is able to manage PleurX catheter.  He denies any fever, but he does have 
cough.  He does have intermittent chest discomfort from coughing.  He denies 
any flu-like illness.  He denies any sick exposure.  He denies any recent 
travel.  He denies any hemoptysis.  He denies any UTI symptoms.  He denies any 
constipation, diarrhea, melena or hematochezia.

 

Initially, this patient was kept on nonrebreather oxygen at the [facility], but that did not improve his shortness of breath and that is 
why he required CPAP and subsequently we decided to admit this patient in the 
hospital in [facility] for acute on chronic respiratory failure.

 

Routine blood test also showed hyperkalemia.  At [facility]
, he was given Solu-Medrol 40 mg, Zithromax 500 mg, Rocephin 1 gram, Lasix 40 mg
, DuoNeb therapy x2, calcium chloride 1 gram and IV fluid.  Subsequently, he 
was transferred to our emergency room.

 

REVIEW OF SYSTEMS:  The following complete review of systems was negative, 
unless otherwise mentioned in the HPI or below:

Constitutional:  Weight loss or gain, ability to conduct usual activities.

Skin:  Rash, itching.

Eyes:  Double vision, pain.

ENT/Mouth:  Nose bleeding, neck stiffness, pain, tenderness.

Cardiovascular:  Palpitations, dyspnea on exertion, orthopnea.

Respiratory:  Shortness of breath, wheezing, cough, hemoptysis, fever or night 
sweats.

Gastrointestinal:  Poor appetite, abdominal pain, heartburn, nausea, vomiting, 
constipation, or diarrhea.

Genitourinary:  Urgency, frequency, dysuria, nocturia.

Musculoskeletal:  Pain, swelling.

Neurologic/Psychiatric:  Anxiety, depression.

Allergy/Immunologic:  Skin rash, bleeding tendency.

 

Please see my HPI for pertinent positive and negative.  All other review of 
systems reviewed and negative except as mentioned in the HPI.

 

PAST MEDICAL HISTORY:  Non-small cell lung cancer (adenocarcinoma advanced) 
followed by Dr. Power, COPD, chronic respiratory failure on home oxygen 
therapy 3-3.5 liters, hypertension, diabetes type 2, coronary artery disease 
required CABG, history of colon cancer, status post colectomy, dyslipidemia, 
hypertension.

 

PAST SURGICAL HISTORY:  Hernia repair, left PleurX catheter placement, 
recurrent thoracentesis, mediastinal exploration with biopsy, left lower 
lobectomy, laparoscopic partial colectomy, inguinal hernia repair, cardiac 
catheterization, CABG in 1993 and redo CABG in 2003, appendicectomy.

 

CURRENT HOME MEDICATIONS:  The patient does not have any medication with him 
and based on most recent discharge from hospital, patient is on amlodipine 2.5 
mg daily, aspirin 81 mg p.o. daily, Coreg 12.5 mg p.o. b.i.d., cetirizine 10 mg 
p.o. daily, vitamin D3 2000 units p.o. daily, glucosamine chondroitin sulfate 1 
capsule daily, DuoNeb q.6 hourly, lisinopril 5 mg p.o. at bedtime, metformin 
500 mg twice daily with multivitamin 1 tablet p.o. daily, Actos 30 mg p.o. daily
, prednisone 20 mg daily, Zocor 10 mg p.o. at bedtime, Restoril 15 mg p.o. at 
bedtime, Incruse Ellipta inhalation daily.

 

ALLERGIES:  IODINATED CONTRAST MEDIA and IODINE.

 

SOCIAL HISTORY:  Patient lives at home.  He is a former smoker.  He currently 
denies any smoking.  No history of alcohol or other illicit drug abuse.

 

FAMILY HISTORY:  Negative for any premature coronary artery disease, stroke or 
cancer.

 

EMERGENCY ROOM COURSE:  Patient has received Solu-Medrol 40 mg, Rocephin 1 gram
, azithromycin 500 mg, IV fluid 1 liter, Lasix 40 mg, calcium chloride 1 g and 
DuoNeb therapy.

 

PHYSICAL EXAMINATION:

VITAL SIGNS:  On arrival, lowest blood pressure is 90/63, pulse 86, respiratory 
rate 26, saturation 61% on room air and 100% on CPAP.  Weight 74.3 kilograms.

GENERAL:  Patient is currently alert, awake, mild respiratory distress, 
nontoxic appearing.

HEAD:  Normocephalic, atraumatic.

EYES:  Pupils round, reactive to light.  Extraocular muscles intact.  No 
nystagmus.

ENT:  Oropharynx within normal limits.  Moist mucous membranes.  No oral 
lesion.  No pharyngeal erythema, no exudate.

NECK:  Supple, no JVD, no thyromegaly, no carotid bruit, no meningeal signs of 
irritation.

LUNGS:  Air entry reduced significantly on both lung fields.  He has air entry 
reduced, more on the left side.  He has PleurX catheter on the left side.  No 
accessory muscles of respiration in use.  No wheezing or rhonchi.  No obvious 
rales noted.

CARDIAC:  S1 and S2 appears regular.  No murmur, no gallop, no rub.

ABDOMEN:  Soft, bowel sounds present, nontender, nondistended.  No organomegaly
, no mass, no suprapubic tenderness.

BACK:  Examination unremarkable, no CVA tenderness.

EXTREMITIES:  Upper extremity passive movements of all joints are normal.  
Lower extremity, trace bilateral pitting edema noted.  Good distal pulsation.

SKIN:  No skin rash.

HEMATOLOGICAL SYSTEM:  No lymphadenopathy.

PSYCHIATRIC:  Normal affect.

NEUROLOGIC:  Nonfocal examination.  He moves all 4 limbs.  Plantar bilateral 
flexor.

PSYCHIATRIC:  Normal affect.

 

IMAGING DATA AND SIGNIFICANT LABORATORY DATA:

1.  EKG showing normal sinus rhythm, T-wave changes.

2.  Chest x-ray showing cardiomegaly nodularity and pleural changes on the 
right side, pleural effusion on the left side, obscuration of left heart border
, left-sided PleurX catheter in place.

3.  CBC:  WBC 14.9, hemoglobin 14.3, platelets 300 with a left shift.

4.  BMP:  Sodium 134, potassium 6.3, chloride 97, carbon dioxide 28, anion gap 
15, BUN 39, creatinine 1.22, glucose 270, calcium 9.5.

5.  LFT:  AST 8, ALT 13, alkaline phosphatase 54, albumin 2.8, lipase 12, CK-MB 
0.8, troponin I 0.012, BNP 60.9.

 

ASSESSMENT AND PLAN/IMPRESSION:

1.  Respiratory distress due to underlying chronic obstructive pulmonary 
disease exacerbation.  Underlying pneumonia cannot be entirely excluded.

2.  Acute on chronic respiratory failure with hypoxia.  The patient is 
requiring bilevel positive airway pressure.  Pulmonary will be consulted and 
will wean off bilevel positive airway pressure as needed basis.

3.  Chronic obstructive pulmonary disease exacerbation.  We will treat with 
DuoNeb q.6 hourly and an as needed basis Pulmicort nebulization twice daily, 
Solu-Medrol 20 mg IV q.8 hourly.  Empiric antibiotic therapy with cefepime and 
Levaquin.

4.  Suspected pneumonia.  Given leukocytosis as well as increasing shortness of 
breath and x-ray findings, it is very difficult to ascertain that this patient 
does not have any pneumonia.  It could be changes related with cancer, but I 
will empirically start cefepime and Levaquin.  We will consult Pulmonary for 
their opinion.

5.  Hypotension, likely due to medication related and that is why we will hold 
on Coreg therapy for now and whenever blood pressure permits, then we will 
resume antihypertensive medication as per home dosage.

6.  Hyperkalemia may be related with lisinopril.  We will give him Kayexalate 
30 grams p.o. one time dose as well as the patient is already given calcium 
chloride at the emergency room.  We will repeat BMP tomorrow.

7.  Diabetes type 2.  We will continue with insulin as per sliding scale per 
protocol.  I will continue Actos 30 mg p.o. daily, glimepiride 2 mg p.o. daily, 
metformin 500 mg twice daily.  Diabetic diet will be given.

8.  Hypoalbuminemia, likely related with chronic disease.  That can explain his 
lower extremity pitting edema as well.

9.  Coronary artery disease.  We will continue aspirin 81 mg p.o. daily.  We 
are holding Coreg because of low blood pressure.  We are not giving lisinopril 
because of hyperkalemia.  Currently, cardiac enzymes are negative and EKG 
unremarkable.

10.  Dyslipidemia.  We will continue Zocor 20 mg p.o. at bedtime.

11.  Vitamin D deficiency.  Continue vitamin D3 2000 units p.o. daily.

12.  Left pleural effusion malignant and patient required PleurX catheter 
during previous admission.  Patient knows how to drain pleural fluid as well 
and currently pleural fluid is only minimum.

13.  Dyslipidemia.  Continue Zocor 20 mg p.o. at bedtime.

14.  Deep venous thrombosis prophylaxis, Lovenox 40 mg subcu daily.

15.  Gastrointestinal prophylaxis, Pepcid 20 mg p.o. b.i.d.

16. Code status: Patient does not want poor quality of life but if he is not 
able to make decision at this point if want to be a DNR or not, pt's daughter 
is medical power of .

 

Disposition plan based on clinical course.  We are expecting patient's stay in 
hospital more than 2 midnights.  Plan of care discussed with the patient in 
detail.

 

MTDD

## 2018-03-11 LAB
ALBUMIN SERPL BCG-MCNC: 2.5 G/DL (ref 3.4–4.8)
ALP SERPL-CCNC: 47 U/L (ref 40–150)
ALT SERPL W P-5'-P-CCNC: 10 U/L (ref 8–55)
ANION GAP SERPL CALC-SCNC: 10 MMOL/L (ref 10–20)
AST SERPL-CCNC: 10 U/L (ref 5–34)
BASOPHILS # BLD AUTO: 0.1 THOU/UL (ref 0–0.2)
BASOPHILS NFR BLD AUTO: 0.5 % (ref 0–1)
BILIRUB SERPL-MCNC: 0.2 MG/DL (ref 0.2–1.2)
BUN SERPL-MCNC: 40 MG/DL (ref 8.4–25.7)
CALCIUM SERPL-MCNC: 9.4 MG/DL (ref 7.8–10.44)
CHLORIDE SERPL-SCNC: 95 MMOL/L (ref 98–107)
CO2 SERPL-SCNC: 30 MMOL/L (ref 23–31)
CREAT CL PREDICTED SERPL C-G-VRATE: 53 ML/MIN (ref 70–130)
EOSINOPHIL # BLD AUTO: 0.2 THOU/UL (ref 0–0.7)
EOSINOPHIL NFR BLD AUTO: 1.4 % (ref 0–10)
GLOBULIN SER CALC-MCNC: 1.9 G/DL (ref 2.4–3.5)
GLUCOSE SERPL-MCNC: 200 MG/DL (ref 83–110)
HGB BLD-MCNC: 12.9 G/DL (ref 14–18)
LYMPHOCYTES # BLD: 0.4 THOU/UL (ref 1.2–3.4)
LYMPHOCYTES NFR BLD AUTO: 3.9 % (ref 21–51)
MCH RBC QN AUTO: 33.3 PG (ref 27–31)
MCV RBC AUTO: 99.8 FL (ref 80–94)
MONOCYTES # BLD AUTO: 0.3 THOU/UL (ref 0.11–0.59)
MONOCYTES NFR BLD AUTO: 2.8 % (ref 0–10)
NEUTROPHILS # BLD AUTO: 10.1 THOU/UL (ref 1.4–6.5)
NEUTROPHILS NFR BLD AUTO: 91.4 % (ref 42–75)
PLATELET # BLD AUTO: 272 THOU/UL (ref 130–400)
POTASSIUM SERPL-SCNC: 5.9 MMOL/L (ref 3.5–5.1)
RBC # BLD AUTO: 3.87 MILL/UL (ref 4.7–6.1)
SODIUM SERPL-SCNC: 129 MMOL/L (ref 136–145)
TROPONIN I SERPL DL<=0.01 NG/ML-MCNC: 0.01 NG/ML (ref ?–0.03)
WBC # BLD AUTO: 11.1 THOU/UL (ref 4.8–10.8)

## 2018-03-11 RX ADMIN — INSULIN LISPRO PRN UNIT: 100 INJECTION, SOLUTION INTRAVENOUS; SUBCUTANEOUS at 18:15

## 2018-03-11 RX ADMIN — INSULIN LISPRO PRN UNIT: 100 INJECTION, SOLUTION INTRAVENOUS; SUBCUTANEOUS at 21:10

## 2018-03-11 RX ADMIN — CEFEPIME HYDROCHLORIDE SCH MLS: 2 INJECTION, POWDER, FOR SOLUTION INTRAVENOUS at 06:00

## 2018-03-11 RX ADMIN — CEFEPIME HYDROCHLORIDE SCH MLS: 2 INJECTION, POWDER, FOR SOLUTION INTRAVENOUS at 17:20

## 2018-03-11 RX ADMIN — INSULIN LISPRO PRN UNIT: 100 INJECTION, SOLUTION INTRAVENOUS; SUBCUTANEOUS at 06:31

## 2018-03-11 NOTE — CON
DATE OF CONSULTATION:  03/11/2018

 

Mr. Santana is a 77-year-old male.  He has been followed by me closely since 
he was diagnosed with recurrent non-small cell lung cancer.  He recently 
started immunotherapy.  Also, recently he had a tunneled chest tube placed on 
the left.  He was seen in the office last week and his wife reported to me that 
his pleural drainage was decreasing.  This suggested to me that maybe he was 
starting to respond to the immunotherapy.  He has been having dyspnea on 
exertion ever since his recurrence of his tumor.

 

Subsequently, he was admitted and placed on BiPAP yesterday, because of 
shortness of breath and hypoxemia.

 

PAST MEDICAL HISTORY:  Remarkable for colon cancer that was resected.

 

When his colon cancer was resected, his lung cancer was identified.

 

After recovery from his colon cancer resection, his lung cancer was addressed.

 

PFTs showed an FEV1 of 1.44, which is 64% of predicted.  He underwent a 
lobectomy.  This was on 03/08/2018.

 

The tumor was 6.5 cm.  It invaded the visceral pleura; however, the margins 
were all free of tumor.  There was no tumor seen in nodes.  We were optimistic 
that maybe a reasonable chance of long-term survival.

 

Surprisingly, in November, mediastinoscopy showed a recurrence of non-small 
cell lung cancer.  The tumor surprisingly was 100% PD-L1 positive.

 

He has recently undergone his immunotherapy for this, but has only had 2 or 3 
courses of immunotherapy.

 

He presented with shortness of breath and hypoxia.

 

PAST MEDICAL HISTORY:  Remarkable for,

1.  Moderate obstructive lung disease, hypertension, diabetes, history of 
partial colon resection.

2.  History of coronary artery bypass grafting in 1993 with a redo in 2003.

3.  History of appendectomy and herniorrhaphy.

4.  History of an inguinal hernia, leading to herniorrhaphy.

5.  History of an IODINE allergy.

 

SOCIAL HISTORY:  He is a nonsmoker, nondrinker, he does not use drugs.

 

MEDICATIONS:  Have been reviewed.

 

FAMILY HISTORY:  Negative for lung disease at an early age.

 

REVIEW OF SYSTEMS: 12 point system review remarkable only for shortness of 
breath, which he has had for quite some time.  He was actually recently seen in 
the office and appeared to be stable otherwise negative.

 

He has had no hemoptysis.

 

PHYSICAL EXAMINATION:

GENERAL:  Pt is a pleasant gentleman with a recent recurrence of non- small 
cell lung cancer.

VITAL SIGNS:  He is comfortable on BiPAP.  He is on 8/4.  He is afebrile, heart 
rate is 86, respiratory rate is 22, oximetry is 95.

HEENT:  Pupils are equal.  Sclerae is anicteric.

NECK:  Supple, no lymphadenopathy.

LUNGS:  Remarkable for distant breath sounds.

HEART:  Regular rhythm, no S3.

ABDOMEN:  Soft and nontender.  No masses.

EXTREMITIES:  Without clubbing, cyanosis, or edema.

 

LABORATORY DATA:  White count 11.1, hemoglobin 12.9, platelets 272,000.

 

Sodium 129, potassium 5.9, chloride 95, bicarbonate 30, BUN 40, creatinine 
1.22. 

 

Chest radiograph, reviewed by me, shows progression of his nodules and his left 
basilar abnormalities.

 

His wife has reported that last time she tried to drain him she only drained 
100 mL; she was draining 1000 mL every few days.

 

IMPRESSION:  Probable pseudo-progression.  I think it is reasonable to treat 
him with antibiotics and steroids, but I suspect this is part of his 
immunotherapy and radiographic progression, although clinically appears to be 
improving.  The improvement of his malignant effusion and its requirement for 
drainage argues that he has responded to his immunotherapy.  I will have 
Oncology see him as well.  He will need intermittent noninvasive ventilation, 
nebulizer treatments, steroids, and antibiotics.  I will be happy to follow 
with the other physicians caring for him.  I met with family and answered all 
their questions as well.  This is a 30 minutes critical care time.

 

GREGORY

## 2018-03-11 NOTE — PDOC.PN
- Subjective


Encounter Start Date: 03/11/18


Encounter Start Time: 09:30


-: old records requested/rev





pt is still has dyspnea, not overall doing well, last night used bipap, 

saturating ok now





- Objective


MAR Reviewed: Yes


Vital Signs & Weight: 


 Vital Signs (12 hours)











  Temp Pulse Resp BP Pulse Ox


 


 03/11/18 08:00  98.0 F  90  24 H   94 L


 


 03/11/18 07:43  98.0 F  90  24 H  94/67  94 L


 


 03/11/18 07:04      95


 


 03/11/18 06:28   95  30 H   37 L


 


 03/11/18 06:27   86  23 H   97


 


 03/11/18 06:26   86  23 H   97


 


 03/11/18 05:07  97.9 F  93   104/77  94 L


 


 03/11/18 00:48  97.5 F L  86   92/56 L  95


 


 03/10/18 23:35   84  19   97


 


 03/10/18 23:34   87  27 H   97


 


 03/10/18 21:02  97.8 F  87   94/51 L  94 L








 Weight











Weight                         161 lb 6.4 oz














I&O: 


 











 03/10/18 03/11/18 03/12/18





 05:59 06:59 06:59


 


Intake Total   


 


Output Total   


 


Balance   











Result Diagrams: 


 03/11/18 03:34





 03/11/18 03:34


Additional Labs: 


 Accuchecks











  03/11/18 03/10/18 03/10/18





  05:50 21:32 16:31


 


POC Glucose  204 H  222 H  221 H











EKG Reviewed by me: Yes (nsr)





Phys Exam





- Physical Examination


Constitutional: NAD


HEENT: PERRLA, moist MMs, sclera anicteric


Neck: no JVD, supple


Respiratory: no wheezing, no rhonchi


poor air entry both side


pleurex cath on left side


Cardiovascular: RRR, no significant murmur, no rub


Gastrointestinal: soft, non-tender, no distention, positive bowel sounds


Musculoskeletal: pulses present, edema present


Neurological: non-focal, normal sensation, moves all 4 limbs


Psychiatric: normal affect, A&O x 3


Skin: no rash, normal turgor





Dx/Plan


(1) Acute on chronic respiratory failure with hypoxia


Code(s): J96.21 - ACUTE AND CHRONIC RESPIRATORY FAILURE WITH HYPOXIA   Status: 

Acute   





(2) COPD exacerbation


Code(s): J44.1 - CHRONIC OBSTRUCTIVE PULMONARY DISEASE W (ACUTE) EXACERBATION   

Status: Acute   





(3) Hyperkalemia


Code(s): E87.5 - HYPERKALEMIA   Status: Acute   





(4) Adenocarcinoma of lung, stage 4


Code(s): C34.90 - MALIGNANT NEOPLASM OF UNSP PART OF UNSP BRONCHUS OR LUNG   

Status: Chronic   





(5) CAD (coronary artery disease)


Code(s): I25.10 - ATHSCL HEART DISEASE OF NATIVE CORONARY ARTERY W/O ANG PCTRS 

  Status: Chronic   


Qualifiers: 


 





(6) Diabetes type 2, controlled


Code(s): E11.9 - TYPE 2 DIABETES MELLITUS WITHOUT COMPLICATIONS   Status: 

Chronic   





(7) H/O malignant neoplasm of colon


Code(s): Z85.038 - PERSONAL HISTORY OF MALIGNANT NEOPLASM OF LARGE INTESTINE   

Status: Chronic   Comment: s/p Colectomy 2017   





(8) Hypertension


Code(s): I10 - ESSENTIAL (PRIMARY) HYPERTENSION   Status: Chronic   


Qualifiers: 


 





(9) Hypoalbuminemia


Code(s): E88.09 - OTH DISORDERS OF PLASMA-PROTEIN METABOLISM, NEC   Status: 

Chronic   





(10) Hyponatremia


Code(s): E87.1 - HYPO-OSMOLALITY AND HYPONATREMIA   Status: Chronic   





(11) Macrocytic anemia


Code(s): D53.9 - NUTRITIONAL ANEMIA, UNSPECIFIED   Status: Chronic   





(12) Malignant pleural effusion


Code(s): J91.0 - MALIGNANT PLEURAL EFFUSION   Status: Chronic   





(13) Paroxysmal atrial fibrillation


Code(s): I48.0 - PAROXYSMAL ATRIAL FIBRILLATION   Status: Chronic   





(14) Pneumonia


Code(s): J18.9 - PNEUMONIA, UNSPECIFIED ORGANISM   Status: Suspected   





- Plan


cont current plan of care, continue antibiotics, respiratory therapy





* continue duoneb, pulmicort, solumedrol, mucinex


* continue cefepime, levaqin


* medication reviewed as below


* symptomatic treatment


* pulmonary to see him today


* home medication.








Review of Systems





- Review of Systems


Constitutional: weakness.  negative: fever, chills, sweats, malaise, other


ENT: negative: Ear Pain, Ear Discharge, Nose Pain, Nose Discharge, Nose 

Congestion, Mouth Pain, Mouth Swelling, Throat Pain, Throat Swelling, Other


Respiratory: Cough, Shortness of Breath, SOB with Excertion.  negative: Dry, 

Hemoptysis, Pleuritic Pain, Sputum, Wheezing


Cardiovascular: edema.  negative: chest pain, palpitations, orthopnea, 

paroxysmal nocturnal dyspnea, light headedness, other


Gastrointestinal: negative: Nausea, Vomiting, Abdominal Pain, Diarrhea, 

Constipation, Melena, Hematochezia, Other


Genitourinary: negative: Dysuria, Frequency, Incontinence, Hematuria, Retention

, Other


Musculoskeletal: negative: Neck Pain, Shoulder Pain, Arm Pain, Back Pain, Hand 

Pain, Leg Pain, Foot Pain, Other


Skin: negative: Rash, Lesions, Josiah, Bruising, Other





- Medications/Allergies


Allergies/Adverse Reactions: 


 Allergies











Allergy/AdvReac Type Severity Reaction Status Date / Time


 


Iodinated Contrast- Oral and Allergy   Verified 02/07/18 23:36





IV Dye     


 


iodine Allergy   Verified 02/07/18 23:36











Medications: 


 Current Medications





Acetaminophen (Tylenol)  650 mg PO Q4H PRN


   PRN Reason: Headache/Fever or Pain


Hydrocodone Bitart/Acetaminophen (Norco 5/325)  1 tab PO Q4H PRN


   PRN Reason: Moderate Pain (4-6)


   Last Admin: 03/11/18 09:07 Dose:  1 tab


Al Hydroxide/Mg Hydroxide (Maalox)  30 ml PO Q6H PRN


   PRN Reason: Heartburn  or Indigestion


Albuterol/Ipratropium (Duoneb)  3 ml NEB [id number] [facility]


   Last Admin: 03/11/18 06:26 Dose:  3 ml


Artificial Tears (Tears Naturale)  0 drop EA EYE PRN PRN


   PRN Reason: Dry Eyes


Atorvastatin Calcium (Lipitor)  10 mg PO HS [facility]


   Last Admin: 03/10/18 20:38 Dose:  10 mg


Budesonide (Pulmicort Neb Solution)  0.5 mg INH BID-RT [facility]


   Last Admin: 03/11/18 06:27 Dose:  0.5 mg


Dextrose/Water (Dextrose 50%)  25 gm SLOW IVP PRN PRN


   PRN Reason: Hypoglycemia


Enoxaparin Sodium (Lovenox)  40 mg SC 0900 [facility]


   Last Admin: 03/11/18 09:05 Dose:  40 mg


Famotidine (Pepcid)  20 mg PO BID [facility]


   Last Admin: 03/11/18 09:06 Dose:  20 mg


Glimepiride (Amaryl)  [facility]


   Last Admin: 03/11/18 09:09 Dose:  Not Given


Glucagon (Glucagon)  1 mg IM PRN PRN


   PRN Reason: Hypoglycemia


Guaifenesin (Robitussin Sf)  200 mg PO Q4H PRN


   PRN Reason: Cough


Hydralazine HCl (Apresoline)  10 mg SLOW IVP Q4H PRN


   PRN Reason: Systolic BP > 180


Dextrose/Water (D5w)  1,000 mls @ 0 mls/hr IV .Q0M PRN; As Directed


   PRN Reason: Hypoglycemia


Levofloxacin 500 mg/ Device  100 mls @ 100 mls/hr IVPB 1700 [facility]


   Last Admin: 03/10/18 16:57 Dose:  100 mls


Cefepime HCl 2 gm/ Syringe 2.5 (ml/ Sodium Chloride)  12.5 mls @ 150 mls/hr 

SLOW IVP 0600,1800 [facility]


   Last Admin: 03/11/18 06:00 Dose:  12.5 mls


Insulin Human Lispro (Humalog)  0 units SC .MODERATE SLIDING SC PRN


   PRN Reason: Moderate Correctional Scale


   Last Admin: 03/11/18 06:31 Dose:  4 unit


Insulin Human Lispro (Humalog)  0 units SC .BEDTIME SLIDING SC PRN


   PRN Reason: Bedtime Correctional Scale


   Last Admin: 03/10/18 22:04 Dose:  2 unit


Loperamide HCl (Imodium)  2 mg PO PRN PRN


   PRN Reason: Diarrhea/Loose Stools


Loratadine (Claritin)  10 mg PO DAILYPRN PRN


   PRN Reason: Sinus Symptoms


Magnesium Hydroxide (Milk Of Magnesium)  30 ml PO DAILYPRN PRN


   PRN Reason: Constipation


Metformin HCl (Glucophage)  500 mg PO 1000,2200 [facility]


   Last Admin: 03/11/18 09:07 Dose:  500 mg


Methylprednisolone Sodium Succinate (Solu-Medrol)  20 mg IVP Q8HR [facility]


   Last Admin: 03/11/18 06:01 Dose:  20 mg


Mineral Oil/White Petrolatum (Eucerin Cream)  0 gm TOP BIDPRN PRN


   PRN Reason: Dry Skin


Niacin (Niacin)  500 mg PO DAILY [facility]


   Last Admin: 03/11/18 09:10 Dose:  Not Given


Ondansetron HCl (Zofran Odt)  4 mg PO Q6H PRN


   PRN Reason: Nausea/Vomiting


Ondansetron HCl (Zofran)  4 mg IVP Q6H PRN


   PRN Reason: Nausea/Vomiting


Phenol (Chloraseptic Spray 180 Ml Bot)  0 ml PO PRN PRN


   PRN Reason: Sore Throat


Pioglitazone HCl (Actos)  30 mg PO DAILY [facility]


   Last Admin: 03/11/18 09:05 Dose:  30 mg


Senna (Senokot)  2 tab PO HSPRN PRN


   PRN Reason: Constipation


Sodium Chloride (Ocean Nasal Spray 0.65%)  0 ml EA NARE QIDPRN PRN


   PRN Reason: Nasal Congestion


Zolpidem Tartrate (Ambien)  5 mg PO HSPRN PRN


   PRN Reason: Insomnia


   Last Admin: 03/10/18 21:56 Dose:  5 mg

## 2018-03-12 LAB
ANALYZER IN CARDIO: (no result)
ANION GAP SERPL CALC-SCNC: 13 MMOL/L (ref 10–20)
BASE EXCESS STD BLDA CALC-SCNC: 2.7 MEQ/L
BASOPHILS # BLD AUTO: 0 THOU/UL (ref 0–0.2)
BASOPHILS NFR BLD AUTO: 0.1 % (ref 0–1)
BUN SERPL-MCNC: 48 MG/DL (ref 8.4–25.7)
CA-I BLDA-SCNC: 1.2 MMOL/L (ref 1.12–1.3)
CALCIUM SERPL-MCNC: 9.5 MG/DL (ref 7.8–10.44)
CHLORIDE SERPL-SCNC: 97 MMOL/L (ref 98–107)
CO2 SERPL-SCNC: 27 MMOL/L (ref 23–31)
CREAT CL PREDICTED SERPL C-G-VRATE: 51 ML/MIN (ref 70–130)
EOSINOPHIL # BLD AUTO: 0.1 THOU/UL (ref 0–0.7)
EOSINOPHIL NFR BLD AUTO: 0.6 % (ref 0–10)
GLUCOSE SERPL-MCNC: 246 MG/DL (ref 83–110)
HCO3 BLDA-SCNC: 28 MEQ/L (ref 22–26)
HCT VFR BLDA CALC: 40.2 % (ref 42–52)
HGB BLD-MCNC: 14 G/DL (ref 14–18)
HGB BLDA-MCNC: 12.7 G/DL (ref 14–18)
LYMPHOCYTES # BLD: 0.5 THOU/UL (ref 1.2–3.4)
LYMPHOCYTES NFR BLD AUTO: 3.1 % (ref 21–51)
MCH RBC QN AUTO: 32.7 PG (ref 27–31)
MCV RBC AUTO: 99.4 FL (ref 80–94)
MONOCYTES # BLD AUTO: 1 THOU/UL (ref 0.11–0.59)
MONOCYTES NFR BLD AUTO: 6.9 % (ref 0–10)
NEUTROPHILS # BLD AUTO: 13.5 THOU/UL (ref 1.4–6.5)
NEUTROPHILS NFR BLD AUTO: 89.3 % (ref 42–75)
PCO2 BLDA: 45.8 MMHG (ref 35–45)
PH BLDA: 7.4 [PH] (ref 7.35–7.45)
PLATELET # BLD AUTO: 325 THOU/UL (ref 130–400)
PO2 BLDA: 53 MMHG (ref 80–100)
POTASSIUM SERPL-SCNC: 5.4 MMOL/L (ref 3.5–5.1)
RBC # BLD AUTO: 4.29 MILL/UL (ref 4.7–6.1)
SODIUM SERPL-SCNC: 132 MMOL/L (ref 136–145)
SPECIMEN DRAWN FROM PATIENT: (no result)
WBC # BLD AUTO: 15.1 THOU/UL (ref 4.8–10.8)

## 2018-03-12 PROCEDURE — 0BH17EZ INSERTION OF ENDOTRACHEAL AIRWAY INTO TRACHEA, VIA NATURAL OR ARTIFICIAL OPENING: ICD-10-PCS | Performed by: INTERNAL MEDICINE

## 2018-03-12 PROCEDURE — 0BJ08ZZ INSPECTION OF TRACHEOBRONCHIAL TREE, VIA NATURAL OR ARTIFICIAL OPENING ENDOSCOPIC: ICD-10-PCS | Performed by: INTERNAL MEDICINE

## 2018-03-12 PROCEDURE — 5A1955Z RESPIRATORY VENTILATION, GREATER THAN 96 CONSECUTIVE HOURS: ICD-10-PCS | Performed by: INTERNAL MEDICINE

## 2018-03-12 RX ADMIN — INSULIN LISPRO PRN UNIT: 100 INJECTION, SOLUTION INTRAVENOUS; SUBCUTANEOUS at 17:19

## 2018-03-12 RX ADMIN — Medication SCH ML: at 21:17

## 2018-03-12 RX ADMIN — CEFEPIME HYDROCHLORIDE SCH MLS: 2 INJECTION, POWDER, FOR SOLUTION INTRAVENOUS at 17:45

## 2018-03-12 RX ADMIN — Medication SCH ML: at 12:55

## 2018-03-12 RX ADMIN — INSULIN LISPRO PRN UNIT: 100 INJECTION, SOLUTION INTRAVENOUS; SUBCUTANEOUS at 06:14

## 2018-03-12 RX ADMIN — INSULIN LISPRO PRN UNIT: 100 INJECTION, SOLUTION INTRAVENOUS; SUBCUTANEOUS at 21:27

## 2018-03-12 RX ADMIN — CEFEPIME HYDROCHLORIDE SCH MLS: 2 INJECTION, POWDER, FOR SOLUTION INTRAVENOUS at 06:13

## 2018-03-12 NOTE — PQF
DATE:    3/12/18                                                   ATTN:  DR. JAROCHO URBINA



Please exercise your independent, professional judgment in responding to the 
clarification form. 

Clinical indicators are provided on the bottom of this form for your review



Please check appropriate box(s):



[  ] Aspiration Pneumonia

[ x ] Empirically treating Gram Negative Pneumonia

[  ] Other diagnosis ___________

[  ] Unable to determine



In addition, please specify:

Present on Admission (POA):  [ x ] Yes             [  ] No             [  ] 
Unable to determine



For continuity of documentation, please document condition throughout progress 
notes and discharge summary.  Thank You.



CLINICAL INDICATORS - SIGNS / SYMPTOMS / LABS



H&P:   INCREASING SOB,  SUSPECTED PNEUMONIA



PN DR. URBNIA 3-12-18:   SUSPECTED PNEUMONIA



WBC:   3-10-18:   14.9,   11.1,  15.1



(MAR)   3-10-18:    MAXIPIME IV



RISK FACTORS:   H&P:    NON SMALL CELL LUNG CANCER,  COPD,  HTN, DM 2,  FORMER 
SMOKER,  CHRONIC 

                            MALIGNANT PLEURAL EFFUSION



TREATMENTS:    (MAR)   3-10-18:    MAXIPIME IV

                          PULMONARY CONSULT

                          (MAR)  DERIK



(This form is maintained as a part of the permanent medical record)

 2015 [employer].  All Rights Reserved

[email or web address]    Office:  [phone]

                                                              

[facility]ENITA

## 2018-03-12 NOTE — OP
DATE OF PROCEDURE:  03/12/2018

 

SURGEON:  Dr. Chai Desai

 

PROCEDURE:  Fiberoptic intubation followed by fiberoptic bronchoscopy.  The patient was given 1 mg of
 Versed.  His throat was sprayed with Hurricaine Spray and a bite block was placed in his mouth.  His
 wife had met with him prior to intubation.

 

The bronchoscope was introduced into his posterior pharyngeal space and quickly passed through his vo
ronal cords without difficulties.  Vocal cords appeared normal.  A 7.5 endotracheal tube was secured ab
ove the main maddi.  He was then connected to Ambu bag ventilation and completely sedated with Verse
d followed by Ativan.

 

Once he was sedated, the bronchoscope was reintroduced via swivel adaptor and passed down to the main
 maddi.  The right lower lobe, right middle lobe, right upper lobe and then left upper lobe bronchus
 was inspected.  His lingula is actually in a position of the left lower lobe bronchus now.

 

No endobronchial lesions were seen on either side.

 

He tolerated the procedure well.  He was connected to mechanical ventilation during the entire proced
ure and tolerated this well.

## 2018-03-12 NOTE — PRG
DATE OF SERVICE:  03/12/2018

 

Mr. Santana apparently early this morning developed rapid atrial fibrillation.  I am told it was ar
ound 4 in the morning, although vital signs do not reflect that.  There is actually no 4:00 a.m.  vit
al signs recorded in the computer.

 

Reviewing orders, a 5 mg dose of metoprolol and 10 mg dose of Cardizem was ordered at 4:15 and 6 a.m.
, respectively.  Initially this was put in as Dr. Akins being the ordering physician.

 

Now it is entered as  _____ ordering this.

 

When I evaluated him around 7:30, he was tachypneic and complaining of not being able to go on much l
onger.

 

He has been moved to the Critical Care Unit since that time.

 

I recommended intubation.  I met with his wife when she came through the door this morning.

 

PHYSICAL EXAMINATION: 

LUNGS:  On exam, he still has crackles and rhonchi bilaterally.  

HEART:  Regular rhythm with a rate of 140 when I examined him.

ABDOMEN:  Soft and nontender.

EXTREMITIES:  Without asymmetry.  He had signs of muscle fatigue.  

 

Respiratory rate was in the high 20s to low 30s.

 

LABORATORY:  White count 15.1, hemoglobin 14.0, platelets 325.

 

Sodium 132, potassium 5.4, chloride 97, bicarbonate 27, BUN 48, creatinine 1.28.

 

IMPRESSION:

1.  Recurrent non-small cell lung cancer.

2.  History of colon cancer, resected.

3.  History of a left lower lobectomy for lung cancer.

4.  Pseudo progression of his lung cancer.

5.  Status post placement of tunneled catheter with gradually decreasing drainage since his immunothe
rapy has been started.

6.  Mild prerenal azotemia.  He was given IV fluids prior to intubation and sedation.  His lab should
 improve overnight.

 

I met with the wife and answered all her questions.  I have also discussed all the above with oncolog
y/Dr. Power.  He has been started on a Cardizem drip and given more Cardizem intravenously.  The pr
oblem is that there is a shortage of Cardizem, so we will have to find an alternative drug.  Amiodaro
ne is less than ideal, but at least acutely is acceptable.  We will continue with nebulized treatment
s, steroids, and antimicrobial therapy.

 

I do not feel that his radiographic abnormalities are secondary to an infectious process.

 

I did a separate dictation on bronchoscopy, but he had no endobronchial disease.

 

Critical care time 45 minutes independent of procedures.

## 2018-03-12 NOTE — CON
DATE OF CONSULTATION:  03/12/2018

 

REASON FOR CONSULTATION:  Atrial fibrillation with a rapid ventricular response.

 

HISTORY OF PRESENT ILLNESS:  Mr. Singleton is a 77-year-old gentleman with history of COPD, coronary a
rtery disease and congestive heart failure as well as lung cancer who developed rapid atrial fibrilla
tion early this morning.

 

The patient has had previous cardiac procedures including previous bypass surgery, he also has had re
current paroxysmal atrial fibrillation.  The patient was here being treated for respiratory disorder 
and difficulty when he went into rapid atrial fibrillation this morning.  This persisted despite intr
avenous diltiazem.  

 

CURRENT MEDICATIONS:  

1.  He was started on intravenous diltiazem.

2.  He is on Lovenox.

3.  Glimepiride

4.  Levofloxacin.

5.  Metformin.

6.  Prednisone.

7.  Niacin.

8.  Actos.

 

ALLERGIES:  IODINE.

 

REVIEW OF SYSTEMS:  Not really obtainable.  He is very short of breath and only talked in very brief 
sentences.  He is about to be intubated.

 

PAST MEDICAL HISTORY:

1.  Coronary disease, previous bypass surgery.  

2.  Congestive heart failure, systolic, diastolic mixed.

3.  Paroxysmal atrial fibrillation.

4.  Lung cancer.

 

FAMILY HISTORY:  Negative for heart disease at a young age.

 

SOCIAL HISTORY:  Previous smoker, has stopped.

 

PHYSICAL EXAMINATION:

GENERAL:  Ill-appearing elderly gentleman.  

VITAL SIGNS:  Blood pressure 112/87, pulse 150, it is irregular.

HEENT:  Eyes; sclerae nonicteric.  Mouth mucous remains moist.

NECK:  Supple, no lymphadenopathy.

LUNGS:  Distant.  He is breathing rapidly with shallow breaths.

CARDIAC:  Distant, irregular, irregular.  No murmur, rub or gallop.

ABDOMEN:  Soft, nontender.

EXTREMITIES:  No clubbing or cyanosis, no edema.

SKIN:  Warm and dry.

PSYCHIATRIC:  Mood and affect normal.  He is a somewhat apprehensive due to his difficulty breathing.


NEUROLOGIC:  Grossly normal.  Moves all extremities.

 

LABORATORY AND X-RAY FINDINGS:  EKG atrial fibrillation with a rapid rate.  

 

Hemoglobin is 14, potassium is 5.4, creatinine 1.28, glucose 220.  Most recent BNP 60.9 on 03/10/2018
.

 

ASSESSMENT:

1.  Lung cancer.

2.  Atrial fibrillation with a rapid ventricular response.

3.  Previous bypass surgery.  

4.  Previous paroxysmal atrial fibrillation.

5.  Chronic obstructive pulmonary disease, lung cancer as mentioned.

 

PLAN:

1.  He has received intravenous diltiazem.

2.  Will give intravenous digoxin.

3.  Further care dictated by the hospital course.  It appears he will need intubation.

## 2018-03-13 LAB
ANALYZER IN CARDIO: (no result)
ANION GAP SERPL CALC-SCNC: 12 MMOL/L (ref 10–20)
BASE EXCESS STD BLDA CALC-SCNC: 1.5 MEQ/L
BASOPHILS # BLD AUTO: 0 THOU/UL (ref 0–0.2)
BASOPHILS NFR BLD AUTO: 0.4 % (ref 0–1)
BUN SERPL-MCNC: 42 MG/DL (ref 8.4–25.7)
CA-I BLDA-SCNC: 1.2 MMOL/L (ref 1.12–1.3)
CALCIUM SERPL-MCNC: 8.2 MG/DL (ref 7.8–10.44)
CHLORIDE SERPL-SCNC: 100 MMOL/L (ref 98–107)
CO2 SERPL-SCNC: 24 MMOL/L (ref 23–31)
CREAT CL PREDICTED SERPL C-G-VRATE: 65 ML/MIN (ref 70–130)
EOSINOPHIL # BLD AUTO: 0 THOU/UL (ref 0–0.7)
EOSINOPHIL NFR BLD AUTO: 0.3 % (ref 0–10)
GLUCOSE SERPL-MCNC: 233 MG/DL (ref 83–110)
HCO3 BLDA-SCNC: 23.7 MEQ/L (ref 22–26)
HCT VFR BLDA CALC: 33 % (ref 42–52)
HGB BLD-MCNC: 11.2 G/DL (ref 14–18)
HGB BLDA-MCNC: 10.3 G/DL (ref 14–18)
LYMPHOCYTES # BLD: 0.3 THOU/UL (ref 1.2–3.4)
LYMPHOCYTES NFR BLD AUTO: 2.6 % (ref 21–51)
MCH RBC QN AUTO: 32.9 PG (ref 27–31)
MCV RBC AUTO: 98.5 FL (ref 80–94)
MONOCYTES # BLD AUTO: 0.5 THOU/UL (ref 0.11–0.59)
MONOCYTES NFR BLD AUTO: 4.6 % (ref 0–10)
NEUTROPHILS # BLD AUTO: 9 THOU/UL (ref 1.4–6.5)
NEUTROPHILS NFR BLD AUTO: 92.1 % (ref 42–75)
O2 A-A PPRESDIFF RESPIRATORY: 243.38 MM[HG] (ref 0–20)
PCO2 BLDA: 29.3 MMHG (ref 35–45)
PH BLDA: 7.53 [PH] (ref 7.35–7.45)
PLATELET # BLD AUTO: 241 THOU/UL (ref 130–400)
PO2 BLDA: 76.5 MMHG (ref 80–100)
POTASSIUM SERPL-SCNC: 4.4 MMOL/L (ref 3.5–5.1)
RBC # BLD AUTO: 3.39 MILL/UL (ref 4.7–6.1)
SODIUM SERPL-SCNC: 132 MMOL/L (ref 136–145)
SPECIMEN DRAWN FROM PATIENT: (no result)
TROPONIN I SERPL DL<=0.01 NG/ML-MCNC: 0.02 NG/ML (ref ?–0.03)
WBC # BLD AUTO: 9.7 THOU/UL (ref 4.8–10.8)

## 2018-03-13 RX ADMIN — GUAIFENESIN SCH MG: 200 SOLUTION ORAL at 12:45

## 2018-03-13 RX ADMIN — CEFEPIME HYDROCHLORIDE SCH MLS: 2 INJECTION, POWDER, FOR SOLUTION INTRAVENOUS at 17:45

## 2018-03-13 RX ADMIN — Medication SCH ML: at 09:23

## 2018-03-13 RX ADMIN — CEFEPIME HYDROCHLORIDE SCH MLS: 2 INJECTION, POWDER, FOR SOLUTION INTRAVENOUS at 05:16

## 2018-03-13 RX ADMIN — INSULIN LISPRO PRN UNIT: 100 INJECTION, SOLUTION INTRAVENOUS; SUBCUTANEOUS at 10:18

## 2018-03-13 RX ADMIN — INSULIN LISPRO PRN UNIT: 100 INJECTION, SOLUTION INTRAVENOUS; SUBCUTANEOUS at 21:04

## 2018-03-13 RX ADMIN — Medication SCH ML: at 20:56

## 2018-03-13 RX ADMIN — AMIODARONE HYDROCHLORIDE SCH MLS: 50 INJECTION, SOLUTION INTRAVENOUS at 13:14

## 2018-03-13 RX ADMIN — INSULIN LISPRO PRN UNIT: 100 INJECTION, SOLUTION INTRAVENOUS; SUBCUTANEOUS at 16:21

## 2018-03-13 RX ADMIN — GUAIFENESIN SCH MG: 200 SOLUTION ORAL at 17:45

## 2018-03-13 RX ADMIN — INSULIN LISPRO PRN UNIT: 100 INJECTION, SOLUTION INTRAVENOUS; SUBCUTANEOUS at 05:16

## 2018-03-13 NOTE — PDOC.PN
- Subjective


Encounter Start Date: 03/13/18


Encounter Start Time: 09:30





Patient seen and examined. pt is intubated and sedated, last night BP was low, 

so albumin was given, currently off cardizem drip, rate controlled, 





- Objective


MAR Reviewed: Yes


Vital Signs & Weight: 


 Vital Signs (12 hours)











  Temp Pulse Resp BP Pulse Ox


 


 03/13/18 08:00  97.6 F  84  23 H  106/60 


 


 03/13/18 07:58   83  16   99


 


 03/13/18 06:00    16  


 


 03/13/18 04:00  98.3 F   16  


 


 03/13/18 02:15   89   90/55 L 


 


 03/13/18 02:00    16  


 


 03/13/18 00:00  98.9 F   16  


 


 03/12/18 22:19   84   88/51 L 


 


 03/12/18 22:00    16  








 Weight











Weight                         163 lb 12.855 oz











 Most Recent Monitor Data











Heart Rate from ECG            94


 


NIBP                           102/58


 


NIBP BP-Mean                   84


 


Respiration from ECG           21


 


SpO2                           99














I&O: 


 











 03/12/18 03/13/18 03/14/18





 06:59 06:59 06:59


 


Intake Total 740 2356.6 


 


Output Total 250 790 80


 


Balance 490 1566.6 -80











Result Diagrams: 


 03/12/18 05:39





 03/12/18 05:39


Additional Labs: 


 Accuchecks











  03/13/18 03/12/18 03/12/18





  05:10 21:27 17:00


 


POC Glucose  244 H  216 H  202 H














  03/12/18





  12:51


 


POC Glucose  165 H











EKG Reviewed by me: Yes (afib)





Phys Exam





- Physical Examination


Constitutional: NAD


HEENT: PERRLA, sclera anicteric


intubated


Neck: no JVD, supple


Respiratory: no wheezing, no rales, no rhonchi


Cardiovascular: no significant murmur, irregular


Gastrointestinal: soft, no distention, positive bowel sounds


Musculoskeletal: pulses present, edema present


Lymphatic: no nodes


Skin: no rash, normal turgor





Dx/Plan


(1) Acute on chronic respiratory failure with hypoxia


Code(s): J96.21 - ACUTE AND CHRONIC RESPIRATORY FAILURE WITH HYPOXIA   Status: 

Acute   





(2) COPD exacerbation


Code(s): J44.1 - CHRONIC OBSTRUCTIVE PULMONARY DISEASE W (ACUTE) EXACERBATION   

Status: Acute   





(3) Hyperkalemia


Code(s): E87.5 - HYPERKALEMIA   Status: Acute   





(4) Adenocarcinoma of lung, stage 4


Code(s): C34.90 - MALIGNANT NEOPLASM OF UNSP PART OF UNSP BRONCHUS OR LUNG   

Status: Chronic   





(5) CAD (coronary artery disease)


Code(s): I25.10 - ATHSCL HEART DISEASE OF NATIVE CORONARY ARTERY W/O ANG PCTRS 

  Status: Chronic   


Qualifiers: 


 





(6) Diabetes type 2, controlled


Code(s): E11.9 - TYPE 2 DIABETES MELLITUS WITHOUT COMPLICATIONS   Status: 

Chronic   





(7) H/O malignant neoplasm of colon


Code(s): Z85.038 - PERSONAL HISTORY OF MALIGNANT NEOPLASM OF LARGE INTESTINE   

Status: Chronic   Comment: s/p Colectomy 2017   





(8) Hypertension


Code(s): I10 - ESSENTIAL (PRIMARY) HYPERTENSION   Status: Chronic   


Qualifiers: 


 





(9) Hypoalbuminemia


Code(s): E88.09 - OTH DISORDERS OF PLASMA-PROTEIN METABOLISM, NEC   Status: 

Chronic   





(10) Hyponatremia


Code(s): E87.1 - HYPO-OSMOLALITY AND HYPONATREMIA   Status: Chronic   





(11) Macrocytic anemia


Code(s): D53.9 - NUTRITIONAL ANEMIA, UNSPECIFIED   Status: Chronic   





(12) Malignant pleural effusion


Code(s): J91.0 - MALIGNANT PLEURAL EFFUSION   Status: Chronic   





(13) Paroxysmal atrial fibrillation


Code(s): I48.0 - PAROXYSMAL ATRIAL FIBRILLATION   Status: Chronic   





(14) Pneumonia


Code(s): J18.9 - PNEUMONIA, UNSPECIFIED ORGANISM   Status: Suspected   





- Plan


cont current plan of care, continue antibiotics, respiratory therapy





* continue cefepime


* continue solumedrol


* continue cardizem via tube


* medication reviewed as below


* symptomatic treatment


* vent as per pulmonary


* respiratory therapy.








Review of Systems





- Review of Systems


Other: 





unable to review due to intubated status





- Medications/Allergies


Allergies/Adverse Reactions: 


 Allergies











Allergy/AdvReac Type Severity Reaction Status Date / Time


 


Iodinated Contrast- Oral and Allergy   Verified 02/07/18 23:36





IV Dye     


 


iodine Allergy   Verified 02/07/18 23:36











Medications: 


 Current Medications





Acetaminophen (Tylenol)  650 mg PO Q4H PRN


   PRN Reason: Headache/Fever or Pain


Al Hydroxide/Mg Hydroxide (Maalox)  30 ml PO Q6H PRN


   PRN Reason: Heartburn  or Indigestion


Albuterol/Ipratropium (Duoneb)  3 ml NEB [id number] [facility]


   Last Admin: 03/13/18 07:58 Dose:  3 ml


Artificial Tears (Tears Naturale)  0 drop EA EYE PRN PRN


   PRN Reason: Dry Eyes


Atorvastatin Calcium (Lipitor)  10 mg PO HS [facility]


   Last Admin: 03/12/18 21:17 Dose:  10 mg


Dextrose/Water (Dextrose 50%)  25 gm SLOW IVP PRN PRN


   PRN Reason: Hypoglycemia


Diltiazem HCl (Cardizem)  60 mg PER TUBE BID [facility]


   Last Admin: 03/13/18 09:23 Dose:  60 mg


Enoxaparin Sodium (Lovenox)  40 mg SC 0900 [facility]


   Last Admin: 03/13/18 09:23 Dose:  40 mg


Famotidine (Pepcid)  20 mg PER TUBE BID [facility]


   Last Admin: 03/13/18 09:23 Dose:  20 mg


Glucagon (Glucagon)  1 mg IM PRN PRN


   PRN Reason: Hypoglycemia


Guaifenesin (Robitussin Sf)  300 mg PER TUBE Q6HR [facility]


Hydralazine HCl (Apresoline)  10 mg SLOW IVP Q4H PRN


   PRN Reason: Systolic BP > 180


Dextrose/Water (D5w)  1,000 mls @ 0 mls/hr IV .Q0M PRN; As Directed


   PRN Reason: Hypoglycemia


Cefepime HCl 2 gm/ Syringe 2.5 (ml/ Sodium Chloride)  12.5 mls @ 150 mls/hr 

SLOW IVP 0600,1800 [facility]


   Last Admin: 03/13/18 05:16 Dose:  12.5 mls


Midazolam HCl (Versed)  100 mls @ 0 mls/hr IVPB INF PRN; Protocol; Titrate


   PRN Reason: Sedation


   Last Admin: 03/12/18 09:40 Dose:  100 mls


Norepinephrine Bitartrate (Levophed)  250 mls @ 0 mls/hr IVPB INF PRN; Protocol

; Titrate


   PRN Reason: Blood Pressure


Sodium Chloride (Normal Saline 0.9%)  1,000 mls @ 100 mls/hr IV .Q10H [facility]


   Last Admin: 03/13/18 09:06 Dose:  1,000 mls


Insulin Human Lispro (Humalog)  0 units SC .MODERATE SLIDING SC PRN


   PRN Reason: Moderate Correctional Scale


   Last Admin: 03/13/18 05:16 Dose:  4 unit


Insulin Human Lispro (Humalog)  0 units SC .BEDTIME SLIDING SC PRN


   PRN Reason: Bedtime Correctional Scale


   Last Admin: 03/12/18 21:27 Dose:  2 unit


Loperamide HCl (Imodium)  2 mg PO PRN PRN


   PRN Reason: Diarrhea/Loose Stools


Magnesium Hydroxide (Milk Of Magnesium)  30 ml PO DAILYPRN PRN


   PRN Reason: Constipation


Methylprednisolone Sodium Succinate (Solu-Medrol)  20 mg IVP Q8HR [facility]


   Last Admin: 03/13/18 05:16 Dose:  20 mg


Mineral Oil/White Petrolatum (Eucerin Cream)  0 gm TOP BIDPRN PRN


   PRN Reason: Dry Skin


Ondansetron HCl (Zofran Odt)  4 mg PO Q6H PRN


   PRN Reason: Nausea/Vomiting


Ondansetron HCl (Zofran)  4 mg IVP Q6H PRN


   PRN Reason: Nausea/Vomiting


Phenol (Chloraseptic Spray 180 Ml Bot)  0 ml PO PRN PRN


   PRN Reason: Sore Throat


Senna (Senokot)  2 tab PO HSPRN PRN


   PRN Reason: Constipation


Sodium Chloride (Ocean Nasal Spray 0.65%)  0 ml EA NARE QIDPRN PRN


   PRN Reason: Nasal Congestion


Sodium Chloride (Flush - Normal Saline)  10 ml IVF Q12HR [facility]


   Last Admin: 03/13/18 09:23 Dose:  10 ml


Sodium Chloride (Flush - Normal Saline)  10 ml IVF PRN PRN


   PRN Reason: Saline Flush

## 2018-03-13 NOTE — PRG
DATE OF SERVICE:  03/13/2018

 

Mr. Santana continues to go in and out of very rapid atrial fibrillation despite oral Cardizem.

 

He is intubated on the ventilator.

 

Patient's review of systems obviously not obtainable, as he is on the ventilator, intubated.  Chest x
-ray shows worsening opacification of the right lung.

 

PHYSICAL EXAMINATION

LUNGS:  Clear.

CARDIAC:  Normal S1, normal S2 now, but he had very rapid atrial fibrillation just a few minutes ago.


ABDOMEN:  Soft, nontender.

EXTREMITIES:  No edema.

 

ASSESSMENT:

1.  Atrial fibrillation, rapid despite diltiazem per the tube that was very ineffective in controllin
g his rate.

2.  Lung cancer.

 

PLAN:  Really the only reasonable chance of maintaining him in sinus rhythm is amiodarone.  Hopefully
, can avoid this long-term, but in the short term, this is his best option with the patient goes into
 rapid atrial fibrillation, he decompensates, probably had some contribution to him going on the vent
ilator.

 

ADDENDUM:  My partners will be covering in the next few days.  I will be out.

## 2018-03-13 NOTE — RAD
CHEST ONE VIEW:

 

Comparison: 3-10-18

 

History: Lung cancer. Dyspnea. Ventilated patient. Respiratory distress. 

 

FINDINGS: 

Interval placement of a nasogastric and endotracheal tube. Endotracheal tube appears to terminate at 
the level of the clavicles. Nasogastric tube extends beyond the diaphragm, distal tip not seen. There
 appears to be a left sided chest tube. There are sternotomy wires. There is worsening opacification 
of the right lung base. Stable emphysematous changes. Stable cardiac silhouette. No pneumothorax. 

 

IMPRESSION: 

1. Interval placement of endotracheal tube and nasogastric tube. 

2. Stable left sided chest tube. 

3. Worsening opacification of the right lung. 

 

POS: OFF

## 2018-03-13 NOTE — PRG
DATE OF SERVICE:  03/13/2018

 

He is intubated and sedated on 4 mg of Versed.

 

PHYSICAL EXAMINATION: 

VITAL SIGNS:   Pulse 81, blood pressure 106/40, sats 98%, respirations 10.  I's & O's have been 2356 
in, 790 out.  

CHEST:  Chest reveals decreased breath sounds, by rhonchi.

CARDIAC:  Normal S1, S2.

ABDOMEN:  Soft, no masses.  

 

_____ 241.  I see no lab or x-rays done.  All have been ordered today.

 

Neurologically sedated.

 

IMPRESSION:

1.  Respiratory failure.  

2.  Metastatic cancer.

 

PLAN:  I was told by the nurses that family decided they would want vent support.

 

For the time being I am going to continue the vent.  Lab and x-ray have been ordered.  Nutrition will
 be started.

 

He was started on Cardizem for his SVT.  Await input from Oncology regarding prognosis, etc. treatmen
t.  We will discuss with family as they arrive.

 

One-half hour critical care time.

## 2018-03-14 LAB
ANALYZER IN CARDIO: (no result)
ANALYZER IN CARDIO: (no result)
ANION GAP SERPL CALC-SCNC: 10 MMOL/L (ref 10–20)
BASE EXCESS STD BLDA CALC-SCNC: -0.5 MEQ/L
BASE EXCESS STD BLDA CALC-SCNC: -3.8 MEQ/L
BASOPHILS # BLD AUTO: 0.1 THOU/UL (ref 0–0.2)
BASOPHILS NFR BLD AUTO: 0.6 % (ref 0–1)
BUN SERPL-MCNC: 39 MG/DL (ref 8.4–25.7)
CA-I BLDA-SCNC: 1.2 MMOL/L (ref 1.12–1.3)
CA-I BLDA-SCNC: 1.3 MMOL/L (ref 1.12–1.3)
CALCIUM SERPL-MCNC: 8.1 MG/DL (ref 7.8–10.44)
CHLORIDE SERPL-SCNC: 102 MMOL/L (ref 98–107)
CO2 SERPL-SCNC: 24 MMOL/L (ref 23–31)
CREAT CL PREDICTED SERPL C-G-VRATE: 74 ML/MIN (ref 70–130)
EOSINOPHIL # BLD AUTO: 0.1 THOU/UL (ref 0–0.7)
EOSINOPHIL NFR BLD AUTO: 0.5 % (ref 0–10)
GLUCOSE SERPL-MCNC: 300 MG/DL (ref 83–110)
HCO3 BLDA-SCNC: 23.9 MEQ/L (ref 22–26)
HCO3 BLDA-SCNC: 25.1 MEQ/L (ref 22–26)
HCT VFR BLDA CALC: 35.1 % (ref 42–52)
HCT VFR BLDA CALC: 42.2 % (ref 42–52)
HGB BLD-MCNC: 11 G/DL (ref 14–18)
HGB BLDA-MCNC: 11 G/DL (ref 14–18)
HGB BLDA-MCNC: 12.7 G/DL (ref 14–18)
LYMPHOCYTES # BLD: 0.3 THOU/UL (ref 1.2–3.4)
LYMPHOCYTES NFR BLD AUTO: 2.7 % (ref 21–51)
MCH RBC QN AUTO: 33.5 PG (ref 27–31)
MCV RBC AUTO: 99.3 FL (ref 80–94)
MONOCYTES # BLD AUTO: 0.5 THOU/UL (ref 0.11–0.59)
MONOCYTES NFR BLD AUTO: 4.6 % (ref 0–10)
NEUTROPHILS # BLD AUTO: 9.4 THOU/UL (ref 1.4–6.5)
NEUTROPHILS NFR BLD AUTO: 91.6 % (ref 42–75)
O2 A-A PPRESDIFF RESPIRATORY: 221.22 MM[HG] (ref 0–20)
O2 A-A PPRESDIFF RESPIRATORY: 227.47 MM[HG] (ref 0–20)
PCO2 BLDA: 45.3 MMHG (ref 35–45)
PCO2 BLDA: 55.1 MMHG (ref 35–45)
PH BLDA: 7.26 [PH] (ref 7.35–7.45)
PH BLDA: 7.36 [PH] (ref 7.35–7.45)
PLATELET # BLD AUTO: 226 THOU/UL (ref 130–400)
PO2 BLDA: 66.4 MMHG (ref 80–100)
PO2 BLDA: 72.4 MMHG (ref 80–100)
POTASSIUM SERPL-SCNC: 4.3 MMOL/L (ref 3.5–5.1)
RBC # BLD AUTO: 3.29 MILL/UL (ref 4.7–6.1)
SODIUM SERPL-SCNC: 132 MMOL/L (ref 136–145)
SPECIMEN DRAWN FROM PATIENT: (no result)
SPECIMEN DRAWN FROM PATIENT: (no result)
WBC # BLD AUTO: 10.3 THOU/UL (ref 4.8–10.8)

## 2018-03-14 RX ADMIN — CEFEPIME HYDROCHLORIDE SCH MLS: 2 INJECTION, POWDER, FOR SOLUTION INTRAVENOUS at 05:08

## 2018-03-14 RX ADMIN — INSULIN LISPRO PRN UNIT: 100 INJECTION, SOLUTION INTRAVENOUS; SUBCUTANEOUS at 14:59

## 2018-03-14 RX ADMIN — GUAIFENESIN SCH MG: 200 SOLUTION ORAL at 23:57

## 2018-03-14 RX ADMIN — Medication SCH ML: at 09:22

## 2018-03-14 RX ADMIN — CEFEPIME HYDROCHLORIDE SCH MLS: 2 INJECTION, POWDER, FOR SOLUTION INTRAVENOUS at 17:47

## 2018-03-14 RX ADMIN — GUAIFENESIN SCH MG: 200 SOLUTION ORAL at 12:50

## 2018-03-14 RX ADMIN — Medication SCH ML: at 20:55

## 2018-03-14 RX ADMIN — INSULIN LISPRO PRN UNIT: 100 INJECTION, SOLUTION INTRAVENOUS; SUBCUTANEOUS at 09:26

## 2018-03-14 RX ADMIN — INSULIN LISPRO PRN UNIT: 100 INJECTION, SOLUTION INTRAVENOUS; SUBCUTANEOUS at 05:20

## 2018-03-14 RX ADMIN — GUAIFENESIN SCH MG: 200 SOLUTION ORAL at 05:07

## 2018-03-14 RX ADMIN — AMIODARONE HYDROCHLORIDE SCH MLS: 50 INJECTION, SOLUTION INTRAVENOUS at 12:46

## 2018-03-14 RX ADMIN — GUAIFENESIN SCH MG: 200 SOLUTION ORAL at 00:00

## 2018-03-14 RX ADMIN — GUAIFENESIN SCH MG: 200 SOLUTION ORAL at 17:43

## 2018-03-14 RX ADMIN — INSULIN LISPRO PRN UNIT: 100 INJECTION, SOLUTION INTRAVENOUS; SUBCUTANEOUS at 16:57

## 2018-03-14 RX ADMIN — FENTANYL CITRATE SCH MLS: 50 INJECTION, SOLUTION INTRAMUSCULAR; INTRAVENOUS at 14:11

## 2018-03-14 NOTE — RAD
CHEST ONE VIEW:

 

History: 

77-year-old male with history of follow up respiratory insufficiency. 

 

FINDINGS: 

Post underlying sternotomy. Endotracheal tube is in satisfactory position. The distal portion of the 
NG tube is very poorly demonstrated on this study. Although it does appear to extend into the region 
of the stomach. There is persistent confluent aveolar and nodular parenchymal changes in the right mi
d lung zone and right lower lobe with some right pleural effusion. Stable appearing pleural and paren
chymal opacity changes in the left chest. 

 

IMPRESSION: 

Stable alveolar confluent parenchymal changes in the right mid and lower lung zone with right pleural
 effusion and stable pleural and parenchymal changes in the left chest. Life support tubes in place. 
Continued short term follow up. 

 

POS: MATTHEW

## 2018-03-14 NOTE — PDOC.PN
- Subjective


Encounter Start Date: 03/14/18


Encounter Start Time: 09:00





Patient seen and examined. pt is intubated, sedated. No overnight events





- Objective


MAR Reviewed: Yes


Vital Signs & Weight: 


 Vital Signs (12 hours)











  Temp Pulse Resp BP Pulse Ox


 


 03/14/18 08:00  97.8 F   22 H  


 


 03/14/18 06:45   72   116/67 


 


 03/14/18 06:43   73  24 H   93 L


 


 03/14/18 06:00    23 H  


 


 03/14/18 04:00  97.6 F   20  


 


 03/14/18 02:16   85   107/66 


 


 03/14/18 02:00    31 H  


 


 03/14/18 00:00  97.8 F   19  


 


 03/13/18 22:17   79   101/69 


 


 03/13/18 22:00    20  








 Weight











Admit Weight                   160 lb


 


Weight                         169 lb 12.095 oz











 Most Recent Monitor Data











Heart Rate from ECG            91


 


NIBP                           114/60


 


NIBP BP-Mean                   92


 


Respiration from ECG           24


 


SpO2                           91














I&O: 


 











 03/13/18 03/14/18 03/15/18





 06:59 06:59 06:59


 


Intake Total 2390.4 3524.6 45.4


 


Output Total 790 828 50


 


Balance 1600.4 2696.6 -4.6











Result Diagrams: 


 03/14/18 04:33





 03/14/18 04:33


Additional Labs: 


 Accuchecks











  03/14/18 03/14/18 03/13/18





  08:39 05:21 21:05


 


POC Glucose  284 H  316 H  248 H














  03/13/18 03/13/18





  16:14 10:14


 


POC Glucose  213 H  223 H











Radiology Reviewed by me: Yes (chest xray)


EKG Reviewed by me: Yes (nsr)





Phys Exam





- Physical Examination


Constitutional: NAD


intubated, sedated


HEENT: PERRLA, sclera anicteric


Neck: no JVD, supple


Respiratory: no wheezing, no rales, no rhonchi


Cardiovascular: RRR, no significant murmur, no rub


Gastrointestinal: soft, no distention, positive bowel sounds


Musculoskeletal: no edema, pulses present


Lymphatic: no nodes


Skin: no rash, normal turgor





Dx/Plan


(1) Acute on chronic respiratory failure with hypoxia


Code(s): J96.21 - ACUTE AND CHRONIC RESPIRATORY FAILURE WITH HYPOXIA   Status: 

Acute   





(2) COPD exacerbation


Code(s): J44.1 - CHRONIC OBSTRUCTIVE PULMONARY DISEASE W (ACUTE) EXACERBATION   

Status: Acute   





(3) Hyperkalemia


Code(s): E87.5 - HYPERKALEMIA   Status: Acute   





(4) Adenocarcinoma of lung, stage 4


Code(s): C34.90 - MALIGNANT NEOPLASM OF UNSP PART OF UNSP BRONCHUS OR LUNG   

Status: Chronic   





(5) CAD (coronary artery disease)


Code(s): I25.10 - ATHSCL HEART DISEASE OF NATIVE CORONARY ARTERY W/O ANG PCTRS 

  Status: Chronic   


Qualifiers: 


 





(6) Diabetes type 2, controlled


Code(s): E11.9 - TYPE 2 DIABETES MELLITUS WITHOUT COMPLICATIONS   Status: 

Chronic   





(7) H/O malignant neoplasm of colon


Code(s): Z85.038 - PERSONAL HISTORY OF MALIGNANT NEOPLASM OF LARGE INTESTINE   

Status: Chronic   Comment: s/p Colectomy 2017   





(8) Hypertension


Code(s): I10 - ESSENTIAL (PRIMARY) HYPERTENSION   Status: Chronic   


Qualifiers: 


 





(9) Hypoalbuminemia


Code(s): E88.09 - OTH DISORDERS OF PLASMA-PROTEIN METABOLISM, NEC   Status: 

Chronic   





(10) Hyponatremia


Code(s): E87.1 - HYPO-OSMOLALITY AND HYPONATREMIA   Status: Chronic   





(11) Macrocytic anemia


Code(s): D53.9 - NUTRITIONAL ANEMIA, UNSPECIFIED   Status: Chronic   





(12) Malignant pleural effusion


Code(s): J91.0 - MALIGNANT PLEURAL EFFUSION   Status: Chronic   





(13) Paroxysmal atrial fibrillation


Code(s): I48.0 - PAROXYSMAL ATRIAL FIBRILLATION   Status: Chronic   





(14) Pneumonia


Code(s): J18.9 - PNEUMONIA, UNSPECIFIED ORGANISM   Status: Suspected   





- Plan


cont current plan of care, continue antibiotics, respiratory therapy





* pt converted to nsr, continue amiodaron drip as per cardiology


* continue cefepime empirically


* blood sugar high due to tube feeding and steroid, will start his home meds 

metformin and actos via tube


* medication reviewed as below


* symptomatic treatment


* change accucheck q 4 hourly.


* vent management as per pulmonary











Review of Systems





- Review of Systems


Other: 





unable to review due to intubated status





- Medications/Allergies


Allergies/Adverse Reactions: 


 Allergies











Allergy/AdvReac Type Severity Reaction Status Date / Time


 


Iodinated Contrast- Oral and Allergy   Verified 02/07/18 23:36





IV Dye     


 


iodine Allergy   Verified 02/07/18 23:36











Medications: 


 Current Medications





Acetaminophen (Tylenol)  650 mg PO Q4H PRN


   PRN Reason: Headache/Fever or Pain


Al Hydroxide/Mg Hydroxide (Maalox)  30 ml PO Q6H PRN


   PRN Reason: Heartburn  or Indigestion


Albuterol/Ipratropium (Duoneb)  3 ml NEB [id number] [facility]


   Last Admin: 03/14/18 06:43 Dose:  3 ml


Artificial Tears (Tears Naturale)  0 drop EA EYE PRN PRN


   PRN Reason: Dry Eyes


Atorvastatin Calcium (Lipitor)  10 mg PO HS [facility]


   Last Admin: 03/13/18 20:56 Dose:  10 mg


Dextrose/Water (Dextrose 50%)  25 gm SLOW IVP PRN PRN


   PRN Reason: Hypoglycemia


Enoxaparin Sodium (Lovenox)  40 mg SC 0900 [facility]


   Last Admin: 03/14/18 09:24 Dose:  40 mg


Famotidine (Pepcid)  20 mg PER TUBE BID [facility]


   Last Admin: 03/14/18 09:23 Dose:  20 mg


Glucagon (Glucagon)  1 mg IM PRN PRN


   PRN Reason: Hypoglycemia


Guaifenesin (Robitussin Sf)  300 mg PER TUBE Q6HR [facility]


   Last Admin: 03/14/18 05:07 Dose:  300 mg


Hydralazine HCl (Apresoline)  10 mg SLOW IVP Q4H PRN


   PRN Reason: Systolic BP > 180


Dextrose/Water (D5w)  1,000 mls @ 0 mls/hr IV .Q0M PRN; As Directed


   PRN Reason: Hypoglycemia


Cefepime HCl 2 gm/ Syringe 2.5 (ml/ Sodium Chloride)  12.5 mls @ 150 mls/hr 

SLOW IVP 0600,1800 [facility]


   Last Admin: 03/14/18 05:08 Dose:  12.5 mls


Midazolam HCl (Versed)  100 mls @ 0 mls/hr IVPB INF PRN; Protocol; Titrate


   PRN Reason: Sedation


   Last Admin: 03/13/18 13:38 Dose:  100 mls


Norepinephrine Bitartrate (Levophed)  250 mls @ 0 mls/hr IVPB INF PRN; Protocol

; Titrate


   PRN Reason: Blood Pressure


Amiodarone HCl 450 mg/Miscellaneous Medication 1 each/ Dextrose/Water  259 mls 

@ 0 mls/hr IVPB INF [facility]; As Directed


   PRN Reason: Protocol


   Last Admin: 03/13/18 13:14 Dose:  259 mls


Sodium Chloride (Normal Saline 0.9%)  1,000 mls @ 50 mls/hr IV .Q20H [facility]


   Last Admin: 03/14/18 09:24 Dose:  1,000 mls


Insulin Human Lispro (Humalog)  0 units SC .MODERATE SLIDING SC PRN


   PRN Reason: Moderate Correctional Scale


   Last Admin: 03/14/18 09:26 Dose:  4 unit


Insulin Human Lispro (Humalog)  0 units SC .BEDTIME SLIDING SC PRN


   PRN Reason: Bedtime Correctional Scale


   Last Admin: 03/13/18 21:04 Dose:  2 unit


Lidocaine/Epinephrine (Xylocaine 2% W/ Epi 1:200k)  0 ml FS .LOCAL X 1 [facility]


   Stop: 03/14/18 14:00


Loperamide HCl (Imodium)  2 mg PO PRN PRN


   PRN Reason: Diarrhea/Loose Stools


Magnesium Hydroxide (Milk Of Magnesium)  30 ml PO DAILYPRN PRN


   PRN Reason: Constipation


Metformin HCl (Glucophage)  500 mg PER TUBE BID-[facility]


   Last Admin: 03/14/18 09:00 Dose:  500 mg


Methylprednisolone Sodium Succinate (Solu-Medrol)  20 mg IVP Q8HR [facility]


   Last Admin: 03/14/18 05:08 Dose:  20 mg


Mineral Oil/White Petrolatum (Eucerin Cream)  0 gm TOP BIDPRN PRN


   PRN Reason: Dry Skin


Ondansetron HCl (Zofran Odt)  4 mg PO Q6H PRN


   PRN Reason: Nausea/Vomiting


Ondansetron HCl (Zofran)  4 mg IVP Q6H PRN


   PRN Reason: Nausea/Vomiting


Phenol (Chloraseptic Spray 180 Ml Bot)  0 ml PO PRN PRN


   PRN Reason: Sore Throat


Pioglitazone HCl (Actos)  30 mg PER TUBE QAM [facility]


   Last Admin: 03/14/18 09:24 Dose:  30 mg


Senna (Senokot)  2 tab PO HSPRN PRN


   PRN Reason: Constipation


Sodium Chloride (Ocean Nasal Spray 0.65%)  0 ml EA NARE QIDPRN PRN


   PRN Reason: Nasal Congestion


Sodium Chloride (Flush - Normal Saline)  10 ml IVF Q12HR [facility]


   Last Admin: 03/14/18 09:22 Dose:  10 ml


Sodium Chloride (Flush - Normal Saline)  10 ml IVF PRN PRN


   PRN Reason: Saline Flush

## 2018-03-14 NOTE — PRG
DATE OF SERVICE:  03/14/2018

 

He is on the vent on 2.5 mg Versed and now obviously unresponsive, riding the vent.

 

PHYSICAL EXAMINATION: 

VITAL SIGNS:  Blood pressure 116/77, pulse 72, sats 90%, temperature is 97.

CHEST:  Chest revealed bilateral rhonchi.

CARDIAC:  Normal S1, S2, no gallops.

ABDOMEN:  Soft.

 

LABORATORY AND X-RAY FINDINGS:  X-ray shows right-sided infiltrate, left pleural effusion is better. 
 

 

White count 10,000, H&H 9 and 32, platelet count 226.  His pO2 72, pCO2 40.36 on a rate of 10, 50%.  
Electrolytes are normal.  Bronch washing gram negative willian.

 

IMPRESSION:

1.  Supraventricular tachycardia.

2.  Respiratory failure.

3.  Chronic obstructive pulmonary disease.

4.  Probably gram negative pneumonia.

5.  Metastatic cancer.  

 

PLAN:  He is on Maxipime, steroids, scheduled neb treatments.

 

Nutrition and PT.  He is not weanable, though we will try and reduce his sedation, assess his neurolo
gical status.

 

I will follow.  I will discuss with the family as they arrive.

 

One-half hour critical care time.

## 2018-03-14 NOTE — PRG
DATE OF SERVICE:  03/14/2018

 

I was called to see the patient on an emergent basis after he apparently desaturated suddenly in the 
60s, started having agonal respiration.  BiPAP was started and it was clearly upon arrival, he was ha
ving difficulty breathing, not moving much air though he was responsive.  He was told he was going to
 be intubated.  He agreed.  He has family members at the bedside who apparently has made a decision t
o make him a DNR, but because of the acute onset of his dyspnea they have agreed to proceed with intu
bation.

 

He was given no sedation.  A bite block was placed, an endotracheal tube was placed over the bronchos
cope passed via the vocal cords above the maddi, good position.  Both lungs were inspected quickly. 
 There is no pus or endobronchial disease.

 

He had previously undergone a bronchoscopy with Dr. Desai, growing Pseudomonas.

 

Tube was secured, oxygen saturation improved to 97.  He was bagged.  

 

VITAL SIGNS:  Pulse was 130, blood pressure 130/80.  

LUNGS:  Good breath sounds without any wheezing.

 

The patient is now intubated on the vent.  We are continuing nutrition and PT and other medication.  


 

I had a lengthy discussion with the patient's family.  Post-intubation that we are going to keep him 
intubated for at least until the weekend to make sure he is improved.

 

Thereafter, they have to make a decision for ongoing care versus comfort care.

 

PHYSICAL EXAMINATION:

VITAL SIGNS:  Post-intubation sats are 97%, pulse 130, blood pressure 130/80.

CHEST:  Decreased breath sounds without any wheezing.

CARDIAC:  Sinus tachycardia.

ABDOMEN:  Soft, no masses.

 

IMPRESSION:

1.  Acute on chronic respiratory failure.  

2.  Metastatic lung cancer.

3.  Left pleural effusion, status post removal of the pleural catheter today.

 

PLAN:  Continue antibiotics, neb treatments, steroids.

 

I will follow.  

 

Please note this is one-half hour critical care time, exclusive of the intubation.

## 2018-03-15 LAB
ANALYZER IN CARDIO: (no result)
ANION GAP SERPL CALC-SCNC: 11 MMOL/L (ref 10–20)
BASE EXCESS STD BLDA CALC-SCNC: -2.4 MEQ/L
BASOPHILS # BLD AUTO: 0 THOU/UL (ref 0–0.2)
BASOPHILS NFR BLD AUTO: 0.1 % (ref 0–1)
BUN SERPL-MCNC: 46 MG/DL (ref 8.4–25.7)
CA-I BLDA-SCNC: 1.3 MMOL/L (ref 1.12–1.3)
CALCIUM SERPL-MCNC: 8.3 MG/DL (ref 7.8–10.44)
CHLORIDE SERPL-SCNC: 104 MMOL/L (ref 98–107)
CO2 SERPL-SCNC: 24 MMOL/L (ref 23–31)
CREAT CL PREDICTED SERPL C-G-VRATE: 75 ML/MIN (ref 70–130)
EOSINOPHIL # BLD AUTO: 0.1 THOU/UL (ref 0–0.7)
EOSINOPHIL NFR BLD AUTO: 0.6 % (ref 0–10)
GLUCOSE SERPL-MCNC: 218 MG/DL (ref 83–110)
HCO3 BLDA-SCNC: 23.7 MEQ/L (ref 22–26)
HCT VFR BLDA CALC: 41.1 % (ref 42–52)
HGB BLD-MCNC: 11.8 G/DL (ref 14–18)
HGB BLDA-MCNC: 13 G/DL (ref 14–18)
LYMPHOCYTES # BLD: 0.3 THOU/UL (ref 1.2–3.4)
LYMPHOCYTES NFR BLD AUTO: 2.5 % (ref 21–51)
MCH RBC QN AUTO: 33.5 PG (ref 27–31)
MCV RBC AUTO: 99.9 FL (ref 80–94)
MONOCYTES # BLD AUTO: 0.5 THOU/UL (ref 0.11–0.59)
MONOCYTES NFR BLD AUTO: 4.5 % (ref 0–10)
NEUTROPHILS # BLD AUTO: 9.7 THOU/UL (ref 1.4–6.5)
NEUTROPHILS NFR BLD AUTO: 92.3 % (ref 42–75)
PCO2 BLDA: 46 MMHG (ref 35–45)
PH BLDA: 7.33 [PH] (ref 7.35–7.45)
PLATELET # BLD AUTO: 243 THOU/UL (ref 130–400)
PO2 BLDA: 56.3 MMHG (ref 80–100)
POTASSIUM SERPL-SCNC: 4.8 MMOL/L (ref 3.5–5.1)
RBC # BLD AUTO: 3.51 MILL/UL (ref 4.7–6.1)
SODIUM SERPL-SCNC: 134 MMOL/L (ref 136–145)
SPECIMEN DRAWN FROM PATIENT: (no result)
WBC # BLD AUTO: 10.5 THOU/UL (ref 4.8–10.8)

## 2018-03-15 PROCEDURE — 0BH17EZ INSERTION OF ENDOTRACHEAL AIRWAY INTO TRACHEA, VIA NATURAL OR ARTIFICIAL OPENING: ICD-10-PCS | Performed by: INTERNAL MEDICINE

## 2018-03-15 RX ADMIN — FENTANYL CITRATE SCH MLS: 50 INJECTION, SOLUTION INTRAMUSCULAR; INTRAVENOUS at 12:43

## 2018-03-15 RX ADMIN — INSULIN LISPRO PRN UNIT: 100 INJECTION, SOLUTION INTRAVENOUS; SUBCUTANEOUS at 03:53

## 2018-03-15 RX ADMIN — INSULIN LISPRO PRN UNIT: 100 INJECTION, SOLUTION INTRAVENOUS; SUBCUTANEOUS at 00:13

## 2018-03-15 RX ADMIN — Medication SCH ML: at 20:20

## 2018-03-15 RX ADMIN — CEFEPIME HYDROCHLORIDE SCH MLS: 2 INJECTION, POWDER, FOR SOLUTION INTRAVENOUS at 06:06

## 2018-03-15 RX ADMIN — Medication SCH ML: at 08:07

## 2018-03-15 RX ADMIN — CEFEPIME HYDROCHLORIDE SCH MLS: 2 INJECTION, POWDER, FOR SOLUTION INTRAVENOUS at 19:41

## 2018-03-15 RX ADMIN — AMIODARONE HYDROCHLORIDE SCH MLS: 50 INJECTION, SOLUTION INTRAVENOUS at 20:01

## 2018-03-15 RX ADMIN — AMIODARONE HYDROCHLORIDE SCH MLS: 50 INJECTION, SOLUTION INTRAVENOUS at 03:55

## 2018-03-15 RX ADMIN — Medication PRN ML: at 08:07

## 2018-03-15 RX ADMIN — GUAIFENESIN SCH MG: 200 SOLUTION ORAL at 06:21

## 2018-03-15 RX ADMIN — INSULIN LISPRO PRN UNIT: 100 INJECTION, SOLUTION INTRAVENOUS; SUBCUTANEOUS at 10:39

## 2018-03-15 RX ADMIN — GUAIFENESIN SCH MG: 200 SOLUTION ORAL at 19:41

## 2018-03-15 RX ADMIN — GUAIFENESIN SCH MG: 200 SOLUTION ORAL at 12:52

## 2018-03-15 NOTE — PRG
DATE OF SERVICE:  03/15/2018

 

He is intubated on the vent.  X-ray shows diffuse infiltrates bilaterally.

 

His left-sided small bore chest tube was removed yesterday.

 

PHYSICAL EXAMINATION:

VITAL SIGNS:  Blood pressure is 100/80, sats 96%.  He is at a PEEP of 10, 50%.  His I's and O's are 3
524 in, 828 out.

CHEST:  Chest revealed decreased breath sounds, bilateral rhonchi or crackles.

CARDIAC:  Sinus tachycardia.

ABDOMEN:  Soft, no mass.

NEUROLOGIC:  He is awake and responsive.

 

LABORATORY:  White count 10,000, H&H 11 and 35, platelet count 243, pO2 56, pCO2 46.37, rate of 16, 5
0%, PEEP of 7.  Electrolytes are normal.

 

IMPRESSION:

1.  Respiratory failure.  

2.  Metastatic cancer.

3.  Superimposed possible gram negative pneumonia.

4.  Supraventricular tachycardia.

 

PLAN:  He is not weanable.  Continue nutrition, PT, and supportive care, pain relief.

 

I will follow.

 

One-half hour critical care time.

## 2018-03-15 NOTE — PDOC.PN
- Subjective


Encounter Start Date: 03/15/18


Encounter Start Time: 07:10


-: old records requested/rev





yesterday pt was extubated but he developed respi distress and required 

reintubation


Patient seen and examined. No overnight events





- Objective


Resuscitation Status: 


 











Resuscitation Status           FULL:Full Resuscitation














MAR Reviewed: Yes


Vital Signs & Weight: 


 Vital Signs (12 hours)











  Temp Pulse Resp BP Pulse Ox


 


 03/15/18 07:14   86   94/60 


 


 03/15/18 07:07   85  16   93 L


 


 03/15/18 06:00    16  


 


 03/15/18 05:00  98.7 F    


 


 03/15/18 04:00    16  


 


 03/15/18 02:04   81   106/67 


 


 03/15/18 02:00    16  


 


 03/15/18 00:00    16  


 


 03/14/18 22:15   83   87/58 L 


 


 03/14/18 22:00    16  








 Weight











Admit Weight                   160 lb


 


Weight                         184 lb 1.376 oz











 Most Recent Monitor Data











Heart Rate from ECG            95


 


NIBP                           133/91


 


NIBP BP-Mean                   118


 


Respiration from ECG           25


 


SpO2                           96














I&O: 


 











 03/14/18 03/15/18 03/16/18





 06:59 06:59 06:59


 


Intake Total 3524.6 2698.2 


 


Output Total 828 1265 


 


Balance 2696.6 1433.2 











Result Diagrams: 


 03/15/18 03:40





 03/15/18 03:40


Additional Labs: 


 Accuchecks











  03/15/18 03/15/18 03/14/18





  03:51 00:12 20:56


 


POC Glucose  206 H  164 H  119 H














  03/14/18 03/14/18





  16:53 14:58


 


POC Glucose  224 H  298 H











Radiology Reviewed by me: Yes (Chest xray)


EKG Reviewed by me: Yes (NSR)





Phys Exam





- Physical Examination


Constitutional: NAD


intubated, awake on vent


HEENT: PERRLA, sclera anicteric


Neck: no JVD, supple


reduced air entry both side


Cardiovascular: RRR, no significant murmur, no rub


Gastrointestinal: soft, non-tender, no distention, positive bowel sounds


Musculoskeletal: no edema, pulses present


Neurological: moves all 4 limbs


Lymphatic: no nodes


Skin: no rash, normal turgor





Dx/Plan


(1) Acute on chronic respiratory failure with hypoxia


Code(s): J96.21 - ACUTE AND CHRONIC RESPIRATORY FAILURE WITH HYPOXIA   Status: 

Acute   





(2) COPD exacerbation


Code(s): J44.1 - CHRONIC OBSTRUCTIVE PULMONARY DISEASE W (ACUTE) EXACERBATION   

Status: Acute   





(3) Hyperkalemia


Code(s): E87.5 - HYPERKALEMIA   Status: Acute   





(4) Adenocarcinoma of lung, stage 4


Code(s): C34.90 - MALIGNANT NEOPLASM OF UNSP PART OF UNSP BRONCHUS OR LUNG   

Status: Chronic   





(5) CAD (coronary artery disease)


Code(s): I25.10 - ATHSCL HEART DISEASE OF NATIVE CORONARY ARTERY W/O ANG PCTRS 

  Status: Chronic   


Qualifiers: 


 





(6) Diabetes type 2, controlled


Code(s): E11.9 - TYPE 2 DIABETES MELLITUS WITHOUT COMPLICATIONS   Status: 

Chronic   





(7) H/O malignant neoplasm of colon


Code(s): Z85.038 - PERSONAL HISTORY OF MALIGNANT NEOPLASM OF LARGE INTESTINE   

Status: Chronic   Comment: s/p Colectomy 2017   





(8) Hypertension


Code(s): I10 - ESSENTIAL (PRIMARY) HYPERTENSION   Status: Chronic   


Qualifiers: 


 





(9) Hypoalbuminemia


Code(s): E88.09 - OTH DISORDERS OF PLASMA-PROTEIN METABOLISM, NEC   Status: 

Chronic   





(10) Hyponatremia


Code(s): E87.1 - HYPO-OSMOLALITY AND HYPONATREMIA   Status: Chronic   





(11) Macrocytic anemia


Code(s): D53.9 - NUTRITIONAL ANEMIA, UNSPECIFIED   Status: Chronic   





(12) Malignant pleural effusion


Code(s): J91.0 - MALIGNANT PLEURAL EFFUSION   Status: Chronic   





(13) Paroxysmal atrial fibrillation


Code(s): I48.0 - PAROXYSMAL ATRIAL FIBRILLATION   Status: Chronic   





(14) Pneumonia


Code(s): J18.9 - PNEUMONIA, UNSPECIFIED ORGANISM   Status: Suspected   





- Plan


cont current plan of care, continue antibiotics, respiratory therapy





* continue vent as per pulmonary


* pt is not weanable today


* medication reviewed as below


* symptomatic treatment


* continue empiric cefepime.


* continue amiodarone











Review of Systems





- Review of Systems


Other: 





unable to review due to intubated status





- Medications/Allergies


Allergies/Adverse Reactions: 


 Allergies











Allergy/AdvReac Type Severity Reaction Status Date / Time


 


Iodinated Contrast- Oral and Allergy   Verified 02/07/18 23:36





IV Dye     


 


iodine Allergy   Verified 02/07/18 23:36











Medications: 


 Current Medications





Acetaminophen (Tylenol)  650 mg PO Q4H PRN


   PRN Reason: Headache/Fever or Pain


Al Hydroxide/Mg Hydroxide (Maalox)  30 ml PO Q6H PRN


   PRN Reason: Heartburn  or Indigestion


Albuterol/Ipratropium (Duoneb)  3 ml NEB [id number] [facility]


   Last Admin: 03/15/18 07:07 Dose:  3 ml


Artificial Tears (Tears Naturale)  0 drop EA EYE PRN PRN


   PRN Reason: Dry Eyes


Atorvastatin Calcium (Lipitor)  10 mg PO HS [facility]


   Last Admin: 03/14/18 20:55 Dose:  10 mg


Dextrose/Water (Dextrose 50%)  25 gm SLOW IVP PRN PRN


   PRN Reason: Hypoglycemia


Enoxaparin Sodium (Lovenox)  40 mg SC 0900 [facility]


   Last Admin: 03/14/18 09:24 Dose:  40 mg


Famotidine (Pepcid)  20 mg PER TUBE BID [facility]


   Last Admin: 03/14/18 20:55 Dose:  20 mg


Glucagon (Glucagon)  1 mg IM PRN PRN


   PRN Reason: Hypoglycemia


Guaifenesin (Robitussin Sf)  300 mg PER TUBE Q6HR [facility]


   Last Admin: 03/15/18 06:21 Dose:  300 mg


Hydralazine HCl (Apresoline)  10 mg SLOW IVP Q4H PRN


   PRN Reason: Systolic BP > 180


Dextrose/Water (D5w)  1,000 mls @ 0 mls/hr IV .Q0M PRN; As Directed


   PRN Reason: Hypoglycemia


Cefepime HCl 2 gm/ Syringe 2.5 (ml/ Sodium Chloride)  12.5 mls @ 150 mls/hr 

SLOW IVP 0600,1800 [facility]


   Last Admin: 03/15/18 06:06 Dose:  12.5 mls


Norepinephrine Bitartrate (Levophed)  250 mls @ 0 mls/hr IVPB INF PRN; Protocol

; Titrate


   PRN Reason: Blood Pressure


Amiodarone HCl 450 mg/Miscellaneous Medication 1 each/ Dextrose/Water  259 mls 

@ 0 mls/hr IVPB INF [facility]; As Directed


   PRN Reason: Protocol


   Last Admin: 03/15/18 03:55 Dose:  259 mls


Sodium Chloride (Normal Saline 0.9%)  1,000 mls @ 50 mls/hr IV .Q20H [facility]


   Last Admin: 03/14/18 14:35 Dose:  1,000 mls


Fentanyl Citrate 2,000 mcg/ (Sodium Chloride)  100 mls @ 0 mls/hr IV INF [facility]


   PRN Reason: As Directed


   Last Admin: 03/14/18 14:11 Dose:  100 mls


Fentanyl Citrate (Fentanyl Bolus)  250 mls @ 0 mls/hr IVPB PRN PRN; As Directed


   PRN Reason: Breakthrough pain


   Stop: 04/13/18 14:46


Insulin Human Lispro (Humalog)  0 units SC .MODERATE SLIDING SC PRN


   PRN Reason: Moderate Correctional Scale


   Last Admin: 03/15/18 03:53 Dose:  4 unit


Insulin Human Lispro (Humalog)  0 units SC .BEDTIME SLIDING SC PRN


   PRN Reason: Bedtime Correctional Scale


   Last Admin: 03/13/18 21:04 Dose:  2 unit


Loperamide HCl (Imodium)  2 mg PO PRN PRN


   PRN Reason: Diarrhea/Loose Stools


Lorazepam (Ativan)  2 mg SLOW IVP Q2H PRN


   PRN Reason: Anxiety to achieve Cortez 2-3


   Stop: 04/13/18 14:46


   Last Admin: 03/15/18 08:06 Dose:  2 mg


Magnesium Hydroxide (Milk Of Magnesium)  30 ml PO DAILYPRN PRN


   PRN Reason: Constipation


Metformin HCl (Glucophage)  500 mg PER TUBE BID-[facility]


   Last Admin: 03/15/18 07:56 Dose:  500 mg


Methylprednisolone Sodium Succinate (Solu-Medrol)  40 mg IVP BID [facility]


   Last Admin: 03/15/18 08:07 Dose:  40 mg


Mineral Oil/White Petrolatum (Eucerin Cream)  0 gm TOP BIDPRN PRN


   PRN Reason: Dry Skin


Morphine Sulfate (Morphine)  2 mg SLOW IVP Q2H PRN


   PRN Reason:  BREAKTHROUGH PAIN


   Stop: 04/13/18 15:00


Ondansetron HCl (Zofran Odt)  4 mg PO Q6H PRN


   PRN Reason: Nausea/Vomiting


Ondansetron HCl (Zofran)  4 mg IVP Q6H PRN


   PRN Reason: Nausea/Vomiting


Phenol (Chloraseptic Spray 180 Ml Bot)  0 ml PO PRN PRN


   PRN Reason: Sore Throat


Pioglitazone [facility] (Actos)  [facility]


   Last Admin: 03/14/18 09:24 Dose:  30 mg


Propofol (Diprivan)  1,000 mg IV INF PRN; Protocol


   PRN Reason: TO ACHIEVE CORTEZ SCORE 2-3


   Stop: 04/13/18 14:46


Senna (Senokot)  2 tab PO HSPRN PRN


   PRN Reason: Constipation


Sodium Chloride (Ocean Nasal Spray 0.65%)  0 ml EA NARE QIDPRN PRN


   PRN Reason: Nasal Congestion


Sodium Chloride (Flush - Normal Saline)  10 ml IVF Q12HR [facility]


   Last Admin: 03/15/18 08:07 Dose:  10 ml


Sodium Chloride (Flush - Normal Saline)  10 ml IVF PRN PRN


   PRN Reason: Saline Flush


   Last Admin: 03/15/18 08:07 Dose:  10 ml

## 2018-03-15 NOTE — RAD
CHEST 1 VIEW:

 

History 

Dyspnea.

 

COMPARISON: 

3/14/18.

 

FINDINGS: 

Cardiac silhouette remains magnified, enlarged, and partially obscured by increasing airspace disease
 throughout the left lung and the right lower lobe.  The patient is rotated leftward.  Mediastinum is
 midline with postoperative changes.  Lines and tubes appear unchanged in position.  Pulmonary vascul
ature is engorged.

 

IMPRESSION: 

Worsening pulmonary edema and bilateral alveolar infiltrates.

 

POS: [id number]

## 2018-03-16 LAB
ANALYZER IN CARDIO: (no result)
ANION GAP SERPL CALC-SCNC: 10 MMOL/L (ref 10–20)
BASE EXCESS STD BLDA CALC-SCNC: -3.7 MEQ/L
BASOPHILS # BLD AUTO: 0 THOU/UL (ref 0–0.2)
BASOPHILS NFR BLD AUTO: 0.2 % (ref 0–1)
BUN SERPL-MCNC: 51 MG/DL (ref 8.4–25.7)
CA-I BLDA-SCNC: 1.3 MMOL/L (ref 1.12–1.3)
CALCIUM SERPL-MCNC: 8.2 MG/DL (ref 7.8–10.44)
CHLORIDE SERPL-SCNC: 104 MMOL/L (ref 98–107)
CO2 SERPL-SCNC: 24 MMOL/L (ref 23–31)
CREAT CL PREDICTED SERPL C-G-VRATE: 76 ML/MIN (ref 70–130)
EOSINOPHIL # BLD AUTO: 0.1 THOU/UL (ref 0–0.7)
EOSINOPHIL NFR BLD AUTO: 0.8 % (ref 0–10)
GLUCOSE SERPL-MCNC: 243 MG/DL (ref 83–110)
HCO3 BLDA-SCNC: 22.7 MEQ/L (ref 22–26)
HCT VFR BLDA CALC: 40.3 % (ref 42–52)
HGB BLD-MCNC: 11.8 G/DL (ref 14–18)
HGB BLDA-MCNC: 12.3 G/DL (ref 14–18)
LYMPHOCYTES # BLD: 0.4 THOU/UL (ref 1.2–3.4)
LYMPHOCYTES NFR BLD AUTO: 3.4 % (ref 21–51)
MCH RBC QN AUTO: 33.3 PG (ref 27–31)
MCV RBC AUTO: 99.8 FL (ref 80–94)
MONOCYTES # BLD AUTO: 0.6 THOU/UL (ref 0.11–0.59)
MONOCYTES NFR BLD AUTO: 5.4 % (ref 0–10)
NEUTROPHILS # BLD AUTO: 10 THOU/UL (ref 1.4–6.5)
NEUTROPHILS NFR BLD AUTO: 90.2 % (ref 42–75)
PCO2 BLDA: 46.8 MMHG (ref 35–45)
PH BLDA: 7.3 [PH] (ref 7.35–7.45)
PLATELET # BLD AUTO: 233 THOU/UL (ref 130–400)
PO2 BLDA: 64.7 MMHG (ref 80–100)
POTASSIUM SERPL-SCNC: 5 MMOL/L (ref 3.5–5.1)
RBC # BLD AUTO: 3.55 MILL/UL (ref 4.7–6.1)
SODIUM SERPL-SCNC: 133 MMOL/L (ref 136–145)
SPECIMEN DRAWN FROM PATIENT: (no result)
WBC # BLD AUTO: 11.1 THOU/UL (ref 4.8–10.8)

## 2018-03-16 RX ADMIN — Medication SCH ML: at 08:39

## 2018-03-16 RX ADMIN — GUAIFENESIN SCH MG: 200 SOLUTION ORAL at 00:15

## 2018-03-16 RX ADMIN — INSULIN LISPRO PRN UNIT: 100 INJECTION, SOLUTION INTRAVENOUS; SUBCUTANEOUS at 05:07

## 2018-03-16 RX ADMIN — Medication SCH ML: at 20:42

## 2018-03-16 RX ADMIN — AMIODARONE HYDROCHLORIDE SCH MLS: 50 INJECTION, SOLUTION INTRAVENOUS at 10:23

## 2018-03-16 RX ADMIN — INSULIN LISPRO PRN UNIT: 100 INJECTION, SOLUTION INTRAVENOUS; SUBCUTANEOUS at 10:24

## 2018-03-16 RX ADMIN — INSULIN LISPRO PRN UNIT: 100 INJECTION, SOLUTION INTRAVENOUS; SUBCUTANEOUS at 16:13

## 2018-03-16 RX ADMIN — GUAIFENESIN SCH MG: 200 SOLUTION ORAL at 23:06

## 2018-03-16 RX ADMIN — FENTANYL CITRATE SCH MLS: 50 INJECTION, SOLUTION INTRAMUSCULAR; INTRAVENOUS at 07:25

## 2018-03-16 RX ADMIN — CEFEPIME HYDROCHLORIDE SCH MLS: 2 INJECTION, POWDER, FOR SOLUTION INTRAVENOUS at 05:09

## 2018-03-16 RX ADMIN — CEFEPIME HYDROCHLORIDE SCH MLS: 2 INJECTION, POWDER, FOR SOLUTION INTRAVENOUS at 17:22

## 2018-03-16 RX ADMIN — GUAIFENESIN SCH MG: 200 SOLUTION ORAL at 17:06

## 2018-03-16 RX ADMIN — GUAIFENESIN SCH MG: 200 SOLUTION ORAL at 05:09

## 2018-03-16 RX ADMIN — GUAIFENESIN SCH MG: 200 SOLUTION ORAL at 12:47

## 2018-03-16 NOTE — PRG
DATE OF SERVICE:  03/16/2018

 

This morning he is awake, alert, responsive, on the vent.

 

PHYSICAL EXAMINATION: 

VITAL SIGNS:  Blood pressure is 100/80, sats 95%, pulse 80, respiration rate 18.  His I's and O's are
 2698 in, 1264 out.

CHEST:  Extensive crackles, rhonchi.

CARDIAC:  Normal S1, S2.

ABDOMEN:  Soft, no masses. 

 

LABORATORY:  White count 11,000, H&H 11 and 34, platelet count 233, pO2 64, pCO2 47.37 on a rate of 1
6, 50%, PEEP of 10.  Sodium 133, BUN 51, creatinine is 0.98.

 

IMPRESSION:

1.  Respiratory failure.  

2.  Chronic obstructive pulmonary disease.

3.  Pneumonia.

4.  Lung cancer.

5.  Supraventricular tachycardia.

 

PLAN:  He at this stage is not weanable.  Continue steroids, neb treatments, antibiotics.

 

Continue slow hydration.  Nutrition and PT.

 

I will discuss with family.

 

One-half hour critical care time.

## 2018-03-16 NOTE — PDOC.PN
- Subjective


Encounter Start Date: 03/16/18


Encounter Start Time: 08:30





Patient seen and examined. pt is intubated, sedated No overnight events





- Objective


Resuscitation Status: 


 











Resuscitation Status           FULL:Full Resuscitation














MAR Reviewed: Yes


Vital Signs & Weight: 


 Vital Signs (12 hours)











  Temp Pulse Resp BP Pulse Ox


 


 03/16/18 11:19   87   115/67 


 


 03/16/18 08:00  98.3 F  91  22 H   100


 


 03/16/18 07:21   91   


 


 03/16/18 05:57    16  


 


 03/16/18 04:00  98.5 F   16  


 


 03/16/18 02:43   78   


 


 03/16/18 02:42   78  18   98


 


 03/16/18 02:00    16  


 


 03/16/18 00:00  98.4 F   16  








 Weight











Admit Weight                   160 lb


 


Weight                         188 lb 7.924 oz











 Most Recent Monitor Data











Heart Rate from ECG            79


 


NIBP                           100/61


 


NIBP BP-Mean                   65


 


Respiration from ECG           16


 


SpO2                           99














I&O: 


 











 03/15/18 03/16/18 03/17/18





 06:59 06:59 06:59


 


Intake Total 2698.2 1970.3 


 


Output Total 1265 875 155


 


Balance 1433.2 1095.3 -155











Result Diagrams: 


 03/16/18 04:28





 03/16/18 04:28


Additional Labs: 


 Accuchecks











  03/16/18 03/16/18 03/15/18





  10:22 04:29 20:55


 


POC Glucose  185 H  222 H  138 H














  03/15/18 03/15/18





  16:19 12:24


 


POC Glucose  175 H  264 H











Radiology Reviewed by me: Yes (chest xray)


EKG Reviewed by me: Yes (nsr)





Phys Exam





- Physical Examination


Constitutional: NAD


intubated, sedated


HEENT: PERRLA, sclera anicteric


Neck: no JVD, supple


Respiratory: no wheezing, no rales, no rhonchi


Cardiovascular: RRR, no significant murmur, no rub


Gastrointestinal: soft, no distention, positive bowel sounds


Musculoskeletal: no edema, pulses present


Lymphatic: no nodes


Skin: no rash, normal turgor





Dx/Plan


(1) Acute on chronic respiratory failure with hypoxia


Code(s): J96.21 - ACUTE AND CHRONIC RESPIRATORY FAILURE WITH HYPOXIA   Status: 

Acute   





(2) COPD exacerbation


Code(s): J44.1 - CHRONIC OBSTRUCTIVE PULMONARY DISEASE W (ACUTE) EXACERBATION   

Status: Acute   





(3) Hyperkalemia


Code(s): E87.5 - HYPERKALEMIA   Status: Acute   





(4) Adenocarcinoma of lung, stage 4


Code(s): C34.90 - MALIGNANT NEOPLASM OF UNSP PART OF UNSP BRONCHUS OR LUNG   

Status: Chronic   





(5) CAD (coronary artery disease)


Code(s): I25.10 - ATHSCL HEART DISEASE OF NATIVE CORONARY ARTERY W/O ANG PCTRS 

  Status: Chronic   


Qualifiers: 


 





(6) Diabetes type 2, controlled


Code(s): E11.9 - TYPE 2 DIABETES MELLITUS WITHOUT COMPLICATIONS   Status: 

Chronic   





(7) H/O malignant neoplasm of colon


Code(s): Z85.038 - PERSONAL HISTORY OF MALIGNANT NEOPLASM OF LARGE INTESTINE   

Status: Chronic   Comment: s/p Colectomy 2017   





(8) Hypertension


Code(s): I10 - ESSENTIAL (PRIMARY) HYPERTENSION   Status: Chronic   


Qualifiers: 


 





(9) Hypoalbuminemia


Code(s): E88.09 - OTH DISORDERS OF PLASMA-PROTEIN METABOLISM, NEC   Status: 

Chronic   





(10) Hyponatremia


Code(s): E87.1 - HYPO-OSMOLALITY AND HYPONATREMIA   Status: Chronic   





(11) Macrocytic anemia


Code(s): D53.9 - NUTRITIONAL ANEMIA, UNSPECIFIED   Status: Chronic   





(12) Malignant pleural effusion


Code(s): J91.0 - MALIGNANT PLEURAL EFFUSION   Status: Chronic   





(13) Paroxysmal atrial fibrillation


Code(s): I48.0 - PAROXYSMAL ATRIAL FIBRILLATION   Status: Chronic   





(14) Pneumonia


Code(s): J18.9 - PNEUMONIA, UNSPECIFIED ORGANISM   Status: Suspected   





- Plan


cont current plan of care, continue antibiotics, respiratory therapy





* pt is not weanable yet


* continue vent as per pulmonary


* medication reviewed as below


* symptomatic treatment.


* continue cefepime


* continue amiodarone


* continue solumedrol








Review of Systems





- Review of Systems


Other: 





unable to review due to intubated status





- Medications/Allergies


Allergies/Adverse Reactions: 


 Allergies











Allergy/AdvReac Type Severity Reaction Status Date / Time


 


Iodinated Contrast- Oral and Allergy   Verified 02/07/18 23:36





IV Dye     


 


iodine Allergy   Verified 02/07/18 23:36











Medications: 


 Current Medications





Acetaminophen (Tylenol)  650 mg PO Q4H PRN


   PRN Reason: Headache/Fever or Pain


Al Hydroxide/Mg Hydroxide (Maalox)  30 ml PO Q6H PRN


   PRN Reason: Heartburn  or Indigestion


Albuterol/Ipratropium (Duoneb)  3 ml NEB [id number] [facility]


   Last Admin: 03/16/18 11:17 Dose:  3 ml


Artificial Tears (Tears Naturale)  0 drop EA EYE PRN PRN


   PRN Reason: Dry Eyes


Atorvastatin Calcium (Lipitor)  10 mg PO HS [facility]


   Last Admin: 03/15/18 20:20 Dose:  10 mg


Dextrose/Water (Dextrose 50%)  25 gm SLOW IVP PRN PRN


   PRN Reason: Hypoglycemia


Enoxaparin Sodium (Lovenox)  40 mg SC 0900 [facility]


   Last Admin: 03/16/18 08:36 Dose:  40 mg


Famotidine (Pepcid)  20 mg PER TUBE BID [facility]


   Last Admin: 03/16/18 08:36 Dose:  20 mg


Glucagon (Glucagon)  1 mg IM PRN PRN


   PRN Reason: Hypoglycemia


Guaifenesin (Robitussin Sf)  300 mg PER TUBE Q6HR [facility]


   Last Admin: 03/16/18 05:09 Dose:  300 mg


Hydralazine HCl (Apresoline)  10 mg SLOW IVP Q4H PRN


   PRN Reason: Systolic BP > 180


Dextrose/Water (D5w)  1,000 mls @ 0 mls/hr IV .Q0M PRN; As Directed


   PRN Reason: Hypoglycemia


Cefepime HCl 2 gm/ Syringe 2.5 (ml/ Sodium Chloride)  12.5 mls @ 150 mls/hr 

SLOW IVP 0600,1800 [facility]


   Last Admin: 03/16/18 05:09 Dose:  12.5 mls


Norepinephrine Bitartrate (Levophed)  250 mls @ 0 mls/hr IVPB INF PRN; Protocol

; Titrate


   PRN Reason: Blood Pressure


Amiodarone HCl 450 mg/Miscellaneous Medication 1 each/ Dextrose/Water  259 mls 

@ 0 mls/hr IVPB INF [facility]; As Directed


   PRN Reason: Protocol


   Last Admin: 03/16/18 10:23 Dose:  259 mls


Fentanyl Citrate 2,000 mcg/ (Sodium Chloride)  100 mls @ 0 mls/hr IV INF [facility]


   PRN Reason: As Directed


   Last Admin: 03/16/18 07:25 Dose:  100 mls


Fentanyl Citrate (Fentanyl Bolus)  250 mls @ 0 mls/hr IVPB PRN PRN; As Directed


   PRN Reason: Breakthrough pain


   Stop: 04/13/18 14:46


Sodium Chloride (Normal Saline 0.9%)  1,000 mls @ 75 mls/hr IV .[id number] [facility]


   Last Admin: 03/16/18 08:37 Dose:  Not Given


Insulin Human Lispro (Humalog)  0 units SC .MODERATE SLIDING SC PRN


   PRN Reason: Moderate Correctional Scale


   Last Admin: 03/16/18 10:24 Dose:  2 unit


Insulin Human Lispro (Humalog)  0 units SC .BEDTIME SLIDING SC PRN


   PRN Reason: Bedtime Correctional Scale


   Last Admin: 03/13/18 21:04 Dose:  2 unit


Loperamide HCl (Imodium)  2 mg PO PRN PRN


   PRN Reason: Diarrhea/Loose Stools


Lorazepam (Ativan)  2 mg SLOW IVP Q2H PRN


   PRN Reason: Anxiety to achieve Cortez 2-3


   Stop: 04/13/18 14:46


   Last Admin: 03/15/18 08:06 Dose:  2 mg


Magnesium Hydroxide (Milk Of Magnesium)  30 ml PO DAILYPRN PRN


   PRN Reason: Constipation


Metformin HCl (Glucophage)  500 mg PER TUBE BID-[facility]


   Last Admin: 03/16/18 08:36 Dose:  500 mg


Methylprednisolone Sodium Succinate (Solu-Medrol)  40 mg IVP BID [facility]


   Last Admin: 03/16/18 08:36 Dose:  40 mg


Mineral Oil/White Petrolatum (Eucerin Cream)  0 gm TOP BIDPRN PRN


   PRN Reason: Dry Skin


Morphine Sulfate (Morphine)  2 mg SLOW IVP Q2H PRN


   PRN Reason:  BREAKTHROUGH PAIN


   Stop: 04/13/18 15:00


Ondansetron HCl (Zofran Odt)  4 mg PO Q6H PRN


   PRN Reason: Nausea/Vomiting


Ondansetron HCl (Zofran)  4 mg IVP Q6H PRN


   PRN Reason: Nausea/Vomiting


Phenol (Chloraseptic Spray 180 Ml Bot)  0 ml PO PRN PRN


   PRN Reason: Sore Throat


Pioglitazone HCl (Actos)  30 mg PER TUBE QAM [facility]


   Last Admin: 03/16/18 08:36 Dose:  30 mg


Propofol (Diprivan)  1,000 mg IV INF PRN; Protocol


   PRN Reason: TO ACHIEVE CORTEZ SCORE 2-3


   Stop: 04/13/18 14:46


Senna (Senokot)  2 tab PO HSPRN PRN


   PRN Reason: Constipation


Sodium Chloride (Ocean Nasal Spray 0.65%)  0 ml EA NARE QIDPRN PRN


   PRN Reason: Nasal Congestion


Sodium Chloride (Flush - Normal Saline)  10 ml IVF Q12HR [facility]


   Last Admin: 03/16/18 08:39 Dose:  10 ml


Sodium Chloride (Flush - Normal Saline)  10 ml IVF PRN PRN


   PRN Reason: Saline Flush


   Last Admin: 03/15/18 08:07 Dose:  10 ml

## 2018-03-16 NOTE — RAD
CHEST 1 VIEW:

 

Date:  03/16/18 

 

HISTORY:  

Dyspnea. Follow-up. 

 

COMPARISON:  

03/15/18. 

 

FINDINGS:

Cardiac silhouette remains magnified, enlarged, and partially obscured by dense bilateral air space d
isease. Mediastinum is midline. Lines and tubes appear unchanged in position. There are postoperative
 changes in the mediastinum. No evidence of pneumothorax. 

 

IMPRESSION: 

Pulmonary edema and other findings are stable. 

 

 

 

POS: [facility]

## 2018-03-17 LAB
ANALYZER IN CARDIO: (no result)
ANION GAP SERPL CALC-SCNC: 11 MMOL/L (ref 10–20)
BASE EXCESS STD BLDA CALC-SCNC: -5.1 MEQ/L
BASOPHILS # BLD AUTO: 0 THOU/UL (ref 0–0.2)
BASOPHILS NFR BLD AUTO: 0 % (ref 0–1)
BUN SERPL-MCNC: 49 MG/DL (ref 8.4–25.7)
CA-I BLDA-SCNC: 1.3 MMOL/L (ref 1.12–1.3)
CALCIUM SERPL-MCNC: 8.2 MG/DL (ref 7.8–10.44)
CHLORIDE SERPL-SCNC: 105 MMOL/L (ref 98–107)
CO2 SERPL-SCNC: 22 MMOL/L (ref 23–31)
CREAT CL PREDICTED SERPL C-G-VRATE: 72 ML/MIN (ref 70–130)
EOSINOPHIL # BLD AUTO: 0.1 THOU/UL (ref 0–0.7)
EOSINOPHIL NFR BLD AUTO: 0.6 % (ref 0–10)
GLUCOSE SERPL-MCNC: 280 MG/DL (ref 83–110)
HCO3 BLDA-SCNC: 20.7 MEQ/L (ref 22–26)
HCT VFR BLDA CALC: 38.8 % (ref 42–52)
HGB BLD-MCNC: 11.9 G/DL (ref 14–18)
HGB BLDA-MCNC: 11.9 G/DL (ref 14–18)
LYMPHOCYTES # BLD: 0.4 THOU/UL (ref 1.2–3.4)
LYMPHOCYTES NFR BLD AUTO: 2.9 % (ref 21–51)
MCH RBC QN AUTO: 33.1 PG (ref 27–31)
MCV RBC AUTO: 99.9 FL (ref 80–94)
MONOCYTES # BLD AUTO: 0.6 THOU/UL (ref 0.11–0.59)
MONOCYTES NFR BLD AUTO: 4.5 % (ref 0–10)
NEUTROPHILS # BLD AUTO: 12.1 THOU/UL (ref 1.4–6.5)
NEUTROPHILS NFR BLD AUTO: 91.9 % (ref 42–75)
O2 A-A PPRESDIFF RESPIRATORY: 231.78 MM[HG] (ref 0–20)
PCO2 BLDA: 41.3 MMHG (ref 35–45)
PH BLDA: 7.32 [PH] (ref 7.35–7.45)
PLATELET # BLD AUTO: 238 THOU/UL (ref 130–400)
PO2 BLDA: 70.6 MMHG (ref 80–100)
POTASSIUM SERPL-SCNC: 5.1 MMOL/L (ref 3.5–5.1)
RBC # BLD AUTO: 3.6 MILL/UL (ref 4.7–6.1)
SODIUM SERPL-SCNC: 133 MMOL/L (ref 136–145)
SPECIMEN DRAWN FROM PATIENT: (no result)
WBC # BLD AUTO: 13.2 THOU/UL (ref 4.8–10.8)

## 2018-03-17 RX ADMIN — AMIODARONE HYDROCHLORIDE SCH MLS: 50 INJECTION, SOLUTION INTRAVENOUS at 01:15

## 2018-03-17 RX ADMIN — Medication SCH ML: at 19:43

## 2018-03-17 RX ADMIN — INSULIN LISPRO PRN UNIT: 100 INJECTION, SOLUTION INTRAVENOUS; SUBCUTANEOUS at 19:44

## 2018-03-17 RX ADMIN — INSULIN LISPRO PRN UNIT: 100 INJECTION, SOLUTION INTRAVENOUS; SUBCUTANEOUS at 17:06

## 2018-03-17 RX ADMIN — INSULIN LISPRO PRN UNIT: 100 INJECTION, SOLUTION INTRAVENOUS; SUBCUTANEOUS at 08:48

## 2018-03-17 RX ADMIN — INSULIN LISPRO PRN UNIT: 100 INJECTION, SOLUTION INTRAVENOUS; SUBCUTANEOUS at 04:58

## 2018-03-17 RX ADMIN — Medication PRN ML: at 08:52

## 2018-03-17 RX ADMIN — GUAIFENESIN SCH MG: 200 SOLUTION ORAL at 12:09

## 2018-03-17 RX ADMIN — Medication SCH ML: at 08:52

## 2018-03-17 RX ADMIN — GUAIFENESIN SCH MG: 200 SOLUTION ORAL at 05:06

## 2018-03-17 RX ADMIN — FENTANYL CITRATE SCH MLS: 50 INJECTION, SOLUTION INTRAMUSCULAR; INTRAVENOUS at 01:15

## 2018-03-17 RX ADMIN — INSULIN LISPRO PRN UNIT: 100 INJECTION, SOLUTION INTRAVENOUS; SUBCUTANEOUS at 12:10

## 2018-03-17 RX ADMIN — CEFEPIME HYDROCHLORIDE SCH MLS: 2 INJECTION, POWDER, FOR SOLUTION INTRAVENOUS at 17:05

## 2018-03-17 RX ADMIN — GUAIFENESIN SCH MG: 200 SOLUTION ORAL at 17:06

## 2018-03-17 RX ADMIN — AMIODARONE HYDROCHLORIDE SCH MLS: 50 INJECTION, SOLUTION INTRAVENOUS at 19:42

## 2018-03-17 RX ADMIN — CEFEPIME HYDROCHLORIDE SCH MLS: 2 INJECTION, POWDER, FOR SOLUTION INTRAVENOUS at 05:05

## 2018-03-17 NOTE — PRG
DATE OF SERVICE:  03/17/2018

 

SUBJECTIVE:  Intubated on vent, and he is awake on low dose fentanyl.  I's & O's have been good.

 

OBJECTIVE:

VITAL SIGNS:  His blood pressure 170/80, pulse 78, respirations 18, 1970 in and 875 out.

CHEST:  Without crackles.

CARDIAC:  Normal S1 and S2, no gallops.

ABDOMEN:  Soft.  No masses.

 

LABORATORY DATA:  White count 13,000, hemoglobin and hematocrit 11 and 36, platelet normal.  His elec
trolytes are normal.  His creatinine is normal.  BUN is 49.  Sodium 133, pO2 is 70, pCO2 of 40, pH 7.
32, rate of 16.

 

IMPRESSION:  Respiratory failure, viral bronchopneumonia _____ metastatic cancer, hyponatremia, supra
ventricular tachycardia.

 

PLAN:  Vent is being adjusted.  Continue Maxipime and steroids.  He is not weanable.  We will follow.


 

One-half hour critical care time.

## 2018-03-17 NOTE — PDOC.PN
- Subjective


Encounter Start Date: 03/17/18


Encounter Start Time: 11:30





Patient seen and examined. No new complaints. No overnight events. On Mech Vent





- Objective


Resuscitation Status: 


 











Resuscitation Status           FULL:Full Resuscitation














MAR Reviewed: Yes


Vital Signs & Weight: 


 Vital Signs (12 hours)











  Temp Pulse Resp BP Pulse Ox


 


 03/17/18 18:27   74   


 


 03/17/18 18:26   74  16   93 L


 


 03/17/18 16:00  98.2 F    


 


 03/17/18 15:52   76   90/53 L 


 


 03/17/18 14:06   75  11 L   96


 


 03/17/18 13:39   79   98/55 L 


 


 03/17/18 12:00  98.1 F    


 


 03/17/18 10:27   83   93/57 L 








 Weight











Admit Weight                   160 lb


 


Weight                         189 lb 6.033 oz











 Most Recent Monitor Data











Heart Rate from ECG            76


 


NIBP                           97/60


 


NIBP BP-Mean                   69


 


Respiration from ECG           14


 


SpO2                           94














I&O: 


 











 03/16/18 03/17/18 03/18/18





 06:59 06:59 06:59


 


Intake Total 1970.3 2648.9 1424


 


Output Total 875 1098 680


 


Balance 1095.3 1550.9 744











Result Diagrams: 


 03/18/18 06:20





 03/17/18 04:18


Additional Labs: 


 Accuchecks











  03/17/18 03/17/18 03/17/18





  17:06 12:09 08:41


 


POC Glucose  214 H  193 H  163 H














  03/16/18 03/16/18





  23:10 20:25


 


POC Glucose  181 H  162 H











EKG Reviewed by me: Yes (Tele SR)





Phys Exam





- Physical Examination


Constitutional: NAD (on Vent, Opens eyes to verbal command)


Respiratory: no wheezing


Scat rales at bases


Cardiovascular: RRR, no rub


Gastrointestinal: soft, non-tender, positive bowel sounds


Musculoskeletal: no edema





Dx/Plan





- Plan


DVT proph w/lovenox, DVT proph w/SCDs





IMPRESSION:


1. Acute on chronic hypoxic resp failue - on Mech Vent


2. COPD Exacerbation


3. Obesity BMI 31.5


4. DM2


5. Afib with RVR - in SR


6. Other issues per previous notes





PLAN:


* Critical care following


* Cont Atbx per Critical care


* Cardio following


* Cont sliding scale


* Cont current meds as below








Review of Systems





- Medications/Allergies


Allergies/Adverse Reactions: 


 Allergies











Allergy/AdvReac Type Severity Reaction Status Date / Time


 


Iodinated Contrast- Oral and Allergy   Verified 02/07/18 23:36





IV Dye     


 


iodine Allergy   Verified 02/07/18 23:36











Medications: 


 Current Medications





Acetaminophen (Tylenol)  650 mg PO Q4H PRN


   PRN Reason: Headache/Fever or Pain


Al Hydroxide/Mg Hydroxide (Maalox)  30 ml PO Q6H PRN


   PRN Reason: Heartburn  or Indigestion


Albuterol/Ipratropium (Duoneb)  3 ml NEB [id number] [facility]


   Last Admin: 03/17/18 18:26 Dose:  3 ml


Artificial Tears (Tears Naturale)  0 drop EA EYE PRN PRN


   PRN Reason: Dry Eyes


Atorvastatin Calcium (Lipitor)  10 mg PO HS [facility]


   Last Admin: 03/17/18 19:42 Dose:  10 mg


Dextrose/Water (Dextrose 50%)  25 gm SLOW IVP PRN PRN


   PRN Reason: Hypoglycemia


Enoxaparin Sodium (Lovenox)  40 mg SC 0900 [facility]


   Last Admin: 03/17/18 08:48 Dose:  40 mg


Famotidine (Pepcid)  20 mg PER TUBE BID [facility]


   Last Admin: 03/17/18 19:42 Dose:  20 mg


Glucagon (Glucagon)  1 mg IM PRN PRN


   PRN Reason: Hypoglycemia


Guaifenesin (Robitussin Sf)  300 mg PER TUBE Q6HR [facility]


   Last Admin: 03/17/18 17:06 Dose:  300 mg


Hydralazine HCl (Apresoline)  10 mg SLOW IVP Q4H PRN


   PRN Reason: Systolic BP > 180


Dextrose/Water (D5w)  1,000 mls @ 0 mls/hr IV .Q0M PRN; As Directed


   PRN Reason: Hypoglycemia


Cefepime HCl 2 gm/ Syringe 2.5 (ml/ Sodium Chloride)  12.5 mls @ 150 mls/hr 

SLOW IVP 0600,1800 [facility]


   Last Admin: 03/17/18 17:05 Dose:  12.5 mls


Norepinephrine Bitartrate (Levophed)  250 mls @ 0 mls/hr IVPB INF PRN; Protocol

; Titrate


   PRN Reason: Blood Pressure


Amiodarone HCl 450 mg/Miscellaneous Medication 1 each/ Dextrose/Water  259 mls 

@ 0 mls/hr IVPB INF LAURA; As Directed


   PRN Reason: Protocol


   Last Admin: 03/17/18 19:42 Dose:  259 mls


Fentanyl Citrate 2,000 mcg/ (Sodium Chloride)  100 mls @ 0 mls/hr IV INF [facility]


   PRN Reason: As Directed


   Last Admin: 03/17/18 01:15 Dose:  100 mls


Fentanyl Citrate (Fentanyl Bolus)  250 mls @ 0 mls/hr IVPB PRN PRN; As Directed


   PRN Reason: Breakthrough pain


   Stop: 04/13/18 14:46


Sodium Chloride (Normal Saline 0.9%)  1,000 mls @ 75 mls/hr IV .[id number] [facility]


   Last Admin: 03/17/18 08:47 Dose:  1,000 mls


Insulin Human Lispro (Humalog)  0 units SC .MODERATE SLIDING SC PRN


   PRN Reason: Moderate Correctional Scale


   Last Admin: 03/17/18 19:44 Dose:  2 unit


Insulin Human Lispro (Humalog)  0 units SC .BEDTIME SLIDING SC PRN


   PRN Reason: Bedtime Correctional Scale


   Last Admin: 03/13/18 21:04 Dose:  2 unit


Loperamide HCl (Imodium)  2 mg PO PRN PRN


   PRN Reason: Diarrhea/Loose Stools


Lorazepam (Ativan)  2 mg SLOW IVP Q2H PRN


   PRN Reason: Anxiety to achieve Cortez 2-3


   Stop: 04/13/18 14:46


   Last Admin: 03/17/18 19:42 Dose:  2 mg


Magnesium Hydroxide (Milk Of Magnesium)  30 ml PO DAILYPRN PRN


   PRN Reason: Constipation


Metformin HCl (Glucophage)  500 mg PER TUBE BID-[facility]


   Last Admin: 03/17/18 17:05 Dose:  500 mg


Methylprednisolone Sodium Succinate (Solu-Medrol)  40 mg IVP BID [facility]


   Last Admin: 03/17/18 19:42 Dose:  40 mg


Metoclopramide HCl (Reglan)  10 mg IVP Q8HR [facility]


   Last Admin: 03/17/18 19:42 Dose:  10 mg


Mineral Oil/White Petrolatum (Eucerin Cream)  0 gm TOP BIDPRN PRN


   PRN Reason: Dry Skin


Morphine Sulfate (Morphine)  2 mg SLOW IVP Q2H PRN


   PRN Reason:  BREAKTHROUGH PAIN


   Stop: 04/13/18 15:00


Ondansetron HCl (Zofran Odt)  4 mg PO Q6H PRN


   PRN Reason: Nausea/Vomiting


Ondansetron HCl (Zofran)  4 mg IVP Q6H PRN


   PRN Reason: Nausea/Vomiting


Phenol (Chloraseptic Spray 180 Ml Bot)  0 ml PO PRN PRN


   PRN Reason: Sore Throat


Pioglitazone HCl (Actos)  30 mg PER TUBE QAM [facility]


   Last Admin: 03/17/18 08:48 Dose:  30 mg


Propofol (Diprivan)  1,000 mg IV INF PRN; Protocol


   PRN Reason: TO ACHIEVE CORTEZ SCORE 2-3


   Stop: 04/13/18 14:46


Senna (Senokot)  2 tab PO HSPRN PRN


   PRN Reason: Constipation


Sodium Chloride (Ocean Nasal Spray 0.65%)  0 ml EA NARE QIDPRN PRN


   PRN Reason: Nasal Congestion


Sodium Chloride (Flush - Normal Saline)  10 ml IVF Q12HR [facility]


   Last Admin: 03/17/18 19:43 Dose:  10 ml


Sodium Chloride (Flush - Normal Saline)  10 ml IVF PRN PRN


   PRN Reason: Saline Flush


   Last Admin: 03/17/18 08:52 Dose:  10 ml

## 2018-03-18 LAB
ANALYZER IN CARDIO: (no result)
ANION GAP SERPL CALC-SCNC: 11 MMOL/L (ref 10–20)
BASE EXCESS STD BLDA CALC-SCNC: -3.5 MEQ/L
BASOPHILS # BLD AUTO: 0 THOU/UL (ref 0–0.2)
BASOPHILS NFR BLD AUTO: 0 % (ref 0–1)
BUN SERPL-MCNC: 39 MG/DL (ref 8.4–25.7)
CALCIUM SERPL-MCNC: 7.5 MG/DL (ref 7.8–10.44)
CHLORIDE SERPL-SCNC: 109 MMOL/L (ref 98–107)
CO2 SERPL-SCNC: 18 MMOL/L (ref 23–31)
CREAT CL PREDICTED SERPL C-G-VRATE: 99 ML/MIN (ref 70–130)
EOSINOPHIL # BLD AUTO: 0.1 THOU/UL (ref 0–0.7)
EOSINOPHIL NFR BLD AUTO: 0.7 % (ref 0–10)
GLUCOSE SERPL-MCNC: 187 MG/DL (ref 83–110)
HCO3 BLDA-SCNC: 21.7 MEQ/L (ref 22–26)
HCT VFR BLDA CALC: 35.9 % (ref 42–52)
HGB BLD-MCNC: 11.2 G/DL (ref 14–18)
HGB BLDA-MCNC: 11.3 G/DL (ref 14–18)
LYMPHOCYTES # BLD: 0.5 THOU/UL (ref 1.2–3.4)
LYMPHOCYTES NFR BLD AUTO: 3.6 % (ref 21–51)
MAGNESIUM SERPL-MCNC: 2.1 MG/DL (ref 1.6–2.6)
MCH RBC QN AUTO: 33.1 PG (ref 27–31)
MCV RBC AUTO: 99.5 FL (ref 80–94)
MONOCYTES # BLD AUTO: 0.8 THOU/UL (ref 0.11–0.59)
MONOCYTES NFR BLD AUTO: 6.2 % (ref 0–10)
NEUTROPHILS # BLD AUTO: 11.3 THOU/UL (ref 1.4–6.5)
NEUTROPHILS NFR BLD AUTO: 89.5 % (ref 42–75)
O2 A-A PPRESDIFF RESPIRATORY: 170.95 MM[HG] (ref 0–20)
PCO2 BLDA: 39.8 MMHG (ref 35–45)
PH BLDA: 7.36 [PH] (ref 7.35–7.45)
PLATELET # BLD AUTO: 186 THOU/UL (ref 130–400)
PO2 BLDA: 62.5 MMHG (ref 80–100)
POTASSIUM SERPL-SCNC: 4.5 MMOL/L (ref 3.5–5.1)
RBC # BLD AUTO: 3.38 MILL/UL (ref 4.7–6.1)
SODIUM SERPL-SCNC: 133 MMOL/L (ref 136–145)
SPECIMEN DRAWN FROM PATIENT: (no result)
WBC # BLD AUTO: 12.7 THOU/UL (ref 4.8–10.8)

## 2018-03-18 RX ADMIN — AMIODARONE HYDROCHLORIDE SCH MLS: 50 INJECTION, SOLUTION INTRAVENOUS at 11:34

## 2018-03-18 RX ADMIN — INSULIN LISPRO PRN UNIT: 100 INJECTION, SOLUTION INTRAVENOUS; SUBCUTANEOUS at 20:03

## 2018-03-18 RX ADMIN — INSULIN LISPRO PRN UNIT: 100 INJECTION, SOLUTION INTRAVENOUS; SUBCUTANEOUS at 17:21

## 2018-03-18 RX ADMIN — GUAIFENESIN SCH MG: 200 SOLUTION ORAL at 23:44

## 2018-03-18 RX ADMIN — Medication SCH ML: at 20:03

## 2018-03-18 RX ADMIN — GUAIFENESIN SCH MG: 200 SOLUTION ORAL at 11:33

## 2018-03-18 RX ADMIN — GUAIFENESIN SCH: 200 SOLUTION ORAL at 03:33

## 2018-03-18 RX ADMIN — INSULIN LISPRO PRN UNIT: 100 INJECTION, SOLUTION INTRAVENOUS; SUBCUTANEOUS at 02:08

## 2018-03-18 RX ADMIN — CEFEPIME HYDROCHLORIDE SCH MLS: 2 INJECTION, POWDER, FOR SOLUTION INTRAVENOUS at 06:27

## 2018-03-18 RX ADMIN — GUAIFENESIN SCH MG: 200 SOLUTION ORAL at 01:16

## 2018-03-18 RX ADMIN — Medication SCH ML: at 09:27

## 2018-03-18 RX ADMIN — CEFEPIME HYDROCHLORIDE SCH MLS: 2 INJECTION, POWDER, FOR SOLUTION INTRAVENOUS at 17:20

## 2018-03-18 RX ADMIN — INSULIN LISPRO PRN UNIT: 100 INJECTION, SOLUTION INTRAVENOUS; SUBCUTANEOUS at 06:30

## 2018-03-18 RX ADMIN — GUAIFENESIN SCH MG: 200 SOLUTION ORAL at 17:21

## 2018-03-18 RX ADMIN — Medication PRN ML: at 09:28

## 2018-03-18 NOTE — PDOC.PN
- Subjective


Encounter Start Date: 03/18/18


Encounter Start Time: 08:00





Patient seen and examined. On Mech Vent. No overnight events





- Objective


Resuscitation Status: 


 











Resuscitation Status           FULL:Full Resuscitation














MAR Reviewed: Yes


Vital Signs & Weight: 


 Vital Signs (12 hours)











  Temp Pulse Resp BP Pulse Ox


 


 03/18/18 22:00    21 H  


 


 03/18/18 20:00  98.1 F  91  21 H   91 L


 


 03/18/18 19:01   79   


 


 03/18/18 19:00   79  22 H   93 L


 


 03/18/18 16:00  98.2 F   13  


 


 03/18/18 15:17   82   102/62 


 


 03/18/18 12:34   78   106/56 L 


 


 03/18/18 12:00  97.6 F   18  


 


 03/18/18 11:04   74   112/64 








 Weight











Admit Weight                   160 lb


 


Weight                         190 lb 4.143 oz











 Most Recent Monitor Data











Heart Rate from ECG            92


 


NIBP                           126/78


 


NIBP BP-Mean                   92


 


Respiration from ECG           20


 


SpO2                           95














I&O: 


 











 03/17/18 03/18/18 03/19/18





 06:59 06:59 06:59


 


Intake Total 2648.9 1484 2183


 


Output Total 1098 1730 745


 


Balance 1550.9 -246 1438











Result Diagrams: 


 03/19/18 04:45





 03/19/18 04:45


Additional Labs: 


 Accuchecks











  03/18/18 03/18/18 03/18/18





  20:03 15:46 11:34


 


POC Glucose  179 H  174 H  95














  03/18/18 03/18/18 03/18/18





  09:12 06:27 01:55


 


POC Glucose  105  190 H  191 H











EKG Reviewed by me: Yes (Tele SR)





Phys Exam





- Physical Examination


Constitutional: NAD


Respiratory: no wheezing


Coarse BS  B/L


Cardiovascular: RRR, no rub


Gastrointestinal: soft, positive bowel sounds


Musculoskeletal: no edema





Dx/Plan





- Plan


DVT proph w/lovenox





IMPRESSION:


1. Acute on chronic hypoxic resp failue - on Mech Vent


2. COPD Exacerbation - on Atbx/Steroids


3. Obesity BMI 31.5


4. DM2 - on mild sliding scale


5. Afib with RVR - in SR - on Amiodarone drip


6. Other issues per previous notes





PLAN:


* Critical care/Cardio following


* Cont Atbx/Steroids


* Cont sliding scale


* Cont current meds as below


* Cont Nebs








Review of Systems





- Review of Systems


Other: 





Cannot obtain due to sedation





- Medications/Allergies


Allergies/Adverse Reactions: 


 Allergies











Allergy/AdvReac Type Severity Reaction Status Date / Time


 


Iodinated Contrast- Oral and Allergy   Verified 02/07/18 23:36





IV Dye     


 


iodine Allergy   Verified 02/07/18 23:36











Medications: 


 Current Medications





Acetaminophen (Tylenol)  650 mg PO Q4H PRN


   PRN Reason: Headache/Fever or Pain


Al Hydroxide/Mg Hydroxide (Maalox)  30 ml PO Q6H PRN


   PRN Reason: Heartburn  or Indigestion


Albuterol/Ipratropium (Duoneb)  3 ml NEB [id number] [facility]


   Last Admin: 03/18/18 19:00 Dose:  3 ml


Artificial Tears (Tears Naturale)  0 drop EA EYE PRN PRN


   PRN Reason: Dry Eyes


Atorvastatin Calcium (Lipitor)  10 mg PO HS [facility]


   Last Admin: 03/18/18 20:03 Dose:  10 mg


Dextrose/Water (Dextrose 50%)  25 gm SLOW IVP PRN PRN


   PRN Reason: Hypoglycemia


Enoxaparin Sodium (Lovenox)  40 mg SC 0900 [facility]


   Last Admin: 03/18/18 09:27 Dose:  40 mg


Famotidine (Pepcid)  20 mg PER TUBE BID [facility]


   Last Admin: 03/18/18 20:03 Dose:  20 mg


Glucagon (Glucagon)  1 mg IM PRN PRN


   PRN Reason: Hypoglycemia


Guaifenesin (Robitussin Sf)  300 mg PER TUBE Q6HR [facility]


   Last Admin: 03/18/18 17:21 Dose:  300 mg


Hydralazine HCl (Apresoline)  10 mg SLOW IVP Q4H PRN


   PRN Reason: Systolic BP > 180


Dextrose/Water (D5w)  1,000 mls @ 0 mls/hr IV .Q0M PRN; As Directed


   PRN Reason: Hypoglycemia


Cefepime HCl 2 gm/ Syringe 2.5 (ml/ Sodium Chloride)  12.5 mls @ 150 mls/hr 

SLOW IVP 0600,1800 [facility]


   Last Admin: 03/18/18 17:20 Dose:  12.5 mls


Norepinephrine Bitartrate (Levophed)  250 mls @ 0 mls/hr IVPB INF PRN; Protocol

; Titrate


   PRN Reason: Blood Pressure


Amiodarone HCl 450 mg/Miscellaneous Medication 1 each/ Dextrose/Water  259 mls 

@ 0 mls/hr IVPB INF LAURA; As Directed


   PRN Reason: Protocol


   Last Admin: 03/18/18 11:34 Dose:  259 mls


Fentanyl Citrate 2,000 mcg/ (Sodium Chloride)  100 mls @ 0 mls/hr IV INF [facility]


   PRN Reason: As Directed


   Last Admin: 03/17/18 01:15 Dose:  100 mls


Fentanyl Citrate (Fentanyl Bolus)  250 mls @ 0 mls/hr IVPB PRN PRN; As Directed


   PRN Reason: Breakthrough pain


   Stop: 04/13/18 14:46


Sodium Chloride (Normal Saline 0.9%)  1,000 mls @ 75 mls/hr IV .[id number] [facility]


   Last Admin: 03/18/18 17:20 Dose:  1,000 mls


Potassium Chloride 40 meq/ (Sodium Chloride)  270 mls @ 135 mls/hr IVPB ASDIR 

PRN


   PRN Reason: FOR SERUM K+ 2.5 - 3.5


Potassium Chloride 40 meq/ (Device)  100 mls @ 50 mls/hr IVPB ASDIR PRN


   PRN Reason: FOR SERUM K+ 2.5 - 3.5


Magnesium Sulfate 1 gm/ Sodium (Chloride)  102 mls @ 102 mls/hr IV PRN PRN


   PRN Reason: MAG LEVEL 1.4 - 2.0


Magnesium Sulfate 2 gm/ Device  100 mls @ 100 mls/hr IVPB ASDIR PRN


   PRN Reason: MAGNESIUM < 1.4


Potassium Phosphate 9 mmol/ (Sodium Chloride)  103 mls @ 25.75 mls/hr IVPB 

ASDIR PRN


   PRN Reason: Phosphate 1.0-1.8


   Last Admin: 03/18/18 09:27 Dose:  103 mls


Potassium Phosphate 12 mmol/ (Sodium Chloride)  254 mls @ 63.5 mls/hr IV ASDIR 

PRN


   PRN Reason: Serum phosphate 0.5-0.9


Potassium Phosphate 15 mmol/ (Sodium Chloride)  255 mls @ 63.75 mls/hr IV ASDIR 

PRN


   PRN Reason: Serum Phos < 0.5


Insulin Human Lispro (Humalog)  0 units SC .MODERATE SLIDING SC PRN


   PRN Reason: Moderate Correctional Scale


   Last Admin: 03/18/18 20:03 Dose:  2 unit


Insulin Human Lispro (Humalog)  0 units SC .BEDTIME SLIDING SC PRN


   PRN Reason: Bedtime Correctional Scale


   Last Admin: 03/13/18 21:04 Dose:  2 unit


Loperamide HCl (Imodium)  2 mg PO PRN PRN


   PRN Reason: Diarrhea/Loose Stools


Lorazepam (Ativan)  2 mg SLOW IVP Q2H PRN


   PRN Reason: Anxiety to achieve Cortez 2-3


   Stop: 04/13/18 14:46


   Last Admin: 03/18/18 19:53 Dose:  2 mg


Magnesium Hydroxide (Milk Of Magnesium)  30 ml PO DAILYPRN PRN


   PRN Reason: Constipation


Magnesium Oxide (Magnesium Oxide)  400 mg PO BIDPRN PRN


   PRN Reason: FOR SERUM MAG 1.4 - 2.0


Magnesium Oxide (Magnesium Oxide)  800 mg PO PRN PRN


   PRN Reason: FOR SERUM MAG < 1.4


Metformin HCl (Glucophage)  500 mg PER TUBE BID-[facility]


   Last Admin: 03/18/18 17:20 Dose:  500 mg


Methylprednisolone Sodium Succinate (Solu-Medrol)  40 mg IVP BID [facility]


   Last Admin: 03/18/18 20:03 Dose:  40 mg


Metoclopramide HCl (Reglan)  10 mg IVP Q8HR [facility]


   Last Admin: 03/18/18 21:47 Dose:  10 mg


Mineral Oil/White Petrolatum (Eucerin Cream)  0 gm TOP BIDPRN PRN


   PRN Reason: Dry Skin


Miscellaneous Medication (Phos-Nak)  1 pkt PO TIDPRN PRN


   PRN Reason: FOR PHOS LEVEL 1.0 - 1.8


Miscellaneous Medication (Phos-Nak)  2 pkt PO TIDPRN PRN


   PRN Reason: FOR PHOS LEVEL 0.5 - 1.0


Morphine Sulfate (Morphine)  2 mg SLOW IVP Q2H PRN


   PRN Reason:  BREAKTHROUGH PAIN


   Stop: 04/13/18 15:00


Ccu Electrolyte (Replacement Protocol)  0 each FS PRN PRN


   PRN Reason: FOR ELECTROLYTE REPLACEMENT


Ondansetron HCl (Zofran Odt)  4 mg PO Q6H PRN


   PRN Reason: Nausea/Vomiting


Ondansetron HCl (Zofran)  4 mg IVP Q6H PRN


   PRN Reason: Nausea/Vomiting


Phenol (Chloraseptic Spray 180 Ml Bot)  0 ml PO PRN PRN


   PRN Reason: Sore Throat


Pioglitazone HCl (Actos)  30 mg PER TUBE QAM [facility]


   Last Admin: 03/18/18 09:28 Dose:  Not Given


Potassium Chloride (K-Dur)  40 meq PO ASDIR PRN


   PRN Reason: FOR SERUM K+  2.5 - 3.5


Potassium Chloride (Klor-Con)  40 meq PER TUBE ASDIR PRN


   PRN Reason: FOR SERUM K+ 2.5-3.5


Propofol (Diprivan)  1,000 mg IV INF PRN; Protocol


   PRN Reason: TO ACHIEVE CORTEZ SCORE 2-3


   Stop: 04/13/18 14:46


Senna (Senokot)  2 tab PO HSPRN PRN


   PRN Reason: Constipation


Sodium Chloride (Ocean Nasal Spray 0.65%)  0 ml EA NARE QIDPRN PRN


   PRN Reason: Nasal Congestion


Sodium Chloride (Flush - Normal Saline)  10 ml IVF Q12HR [facility]


   Last Admin: 03/18/18 20:03 Dose:  10 ml


Sodium Chloride (Flush - Normal Saline)  10 ml IVF PRN PRN


   PRN Reason: Saline Flush


   Last Admin: 03/18/18 09:28 Dose:  10 ml

## 2018-03-18 NOTE — PRG
DATE OF SERVICE:  03/18/2018

 

SUBJECTIVE:  Chai Santana this morning, intubated on the vent, awake, has no drip.

 

OBJECTIVE:

VITAL SIGNS:  Blood pressure 120/56, pulse 60, respirations 18.  His I's and O's have been slightly a
head, 2648 in and 1098 out.

CHEST:  Reveals bilateral crackles.

CARDIAC:  Normal S1, S2.

ADBOMEN:  Soft.  No masses.

 

LABORATORY DATA AND IMAGING DATA:  White count 12,000, hemoglobin and hematocrit 9 and 33, platelet c
ount 186,000.  Electrolytes are normal.  Sodium 133.  X-ray still shows bilateral infiltrates, questi
onable small pleural effusion.

 

IMPRESSION:

1.  Respiratory failure.

2.  Pneumonia with lung cancer.

3.  Severe deconditioning with ileus.

 

PLAN:  Vent is being adjusted.

 

I am not sure he is still weanable at this stage.  Trial of Lasix is being given.

 

We will follow.  One and half hour critical care time.

## 2018-03-18 NOTE — RAD
AP VIEW CHEST:

 

HISTORY: 

Ventilator-dependent patient.

 

COMPARISON: 

Comparison is made to previous exam from 3/16/18.

 

FINDINGS: 

AP view chest demonstrates sternotomy wires seen.  A nasogastric tube is in place.  The patient is in
tubated. 

 

Cardiomegaly is seen.  Pulmonary vascular congestion is seen.

 

Bilateral airspace opacities seen throughout the lungs unchanged since the previous exam.

 

Bilateral pleural effusions seen.

 

IMPRESSION: 

Stable AP view chest with no significant interval changes seen.

 

POS: H

## 2018-03-19 LAB
ANALYZER IN CARDIO: (no result)
ANION GAP SERPL CALC-SCNC: 11 MMOL/L (ref 10–20)
BASE EXCESS STD BLDA CALC-SCNC: -5.7 MEQ/L
BASOPHILS # BLD AUTO: 0 THOU/UL (ref 0–0.2)
BASOPHILS NFR BLD AUTO: 0 % (ref 0–1)
BUN SERPL-MCNC: 38 MG/DL (ref 8.4–25.7)
CA-I BLDA-SCNC: 1.3 MMOL/L (ref 1.12–1.3)
CALCIUM SERPL-MCNC: 8 MG/DL (ref 7.8–10.44)
CHLORIDE SERPL-SCNC: 107 MMOL/L (ref 98–107)
CO2 SERPL-SCNC: 20 MMOL/L (ref 23–31)
CREAT CL PREDICTED SERPL C-G-VRATE: 96 ML/MIN (ref 70–130)
EOSINOPHIL # BLD AUTO: 0.1 THOU/UL (ref 0–0.7)
EOSINOPHIL NFR BLD AUTO: 0.9 % (ref 0–10)
GLUCOSE SERPL-MCNC: 250 MG/DL (ref 83–110)
HCO3 BLDA-SCNC: 20.9 MEQ/L (ref 22–26)
HCT VFR BLDA CALC: 36.6 % (ref 42–52)
HGB BLD-MCNC: 11.7 G/DL (ref 14–18)
HGB BLDA-MCNC: 11.7 G/DL (ref 14–18)
LYMPHOCYTES # BLD: 0.4 THOU/UL (ref 1.2–3.4)
LYMPHOCYTES NFR BLD AUTO: 3.3 % (ref 21–51)
MCH RBC QN AUTO: 33.7 PG (ref 27–31)
MCV RBC AUTO: 99.5 FL (ref 80–94)
MONOCYTES # BLD AUTO: 0.6 THOU/UL (ref 0.11–0.59)
MONOCYTES NFR BLD AUTO: 4.7 % (ref 0–10)
NEUTROPHILS # BLD AUTO: 11.8 THOU/UL (ref 1.4–6.5)
NEUTROPHILS NFR BLD AUTO: 91.1 % (ref 42–75)
PCO2 BLDA: 45.4 MMHG (ref 35–45)
PH BLDA: 7.28 [PH] (ref 7.35–7.45)
PLATELET # BLD AUTO: 220 THOU/UL (ref 130–400)
PO2 BLDA: 62.3 MMHG (ref 80–100)
POTASSIUM SERPL-SCNC: 4.9 MMOL/L (ref 3.5–5.1)
RBC # BLD AUTO: 3.48 MILL/UL (ref 4.7–6.1)
SODIUM SERPL-SCNC: 133 MMOL/L (ref 136–145)
SPECIMEN DRAWN FROM PATIENT: (no result)
WBC # BLD AUTO: 13 THOU/UL (ref 4.8–10.8)

## 2018-03-19 RX ADMIN — Medication SCH ML: at 08:11

## 2018-03-19 RX ADMIN — Medication SCH ML: at 20:36

## 2018-03-19 RX ADMIN — INSULIN LISPRO PRN UNIT: 100 INJECTION, SOLUTION INTRAVENOUS; SUBCUTANEOUS at 20:45

## 2018-03-19 RX ADMIN — CEFEPIME HYDROCHLORIDE SCH MLS: 2 INJECTION, POWDER, FOR SOLUTION INTRAVENOUS at 17:37

## 2018-03-19 RX ADMIN — AMIODARONE HYDROCHLORIDE SCH MLS: 50 INJECTION, SOLUTION INTRAVENOUS at 04:46

## 2018-03-19 RX ADMIN — INSULIN LISPRO PRN UNIT: 100 INJECTION, SOLUTION INTRAVENOUS; SUBCUTANEOUS at 08:26

## 2018-03-19 RX ADMIN — GUAIFENESIN SCH MG: 200 SOLUTION ORAL at 17:35

## 2018-03-19 RX ADMIN — INSULIN LISPRO PRN UNIT: 100 INJECTION, SOLUTION INTRAVENOUS; SUBCUTANEOUS at 17:35

## 2018-03-19 RX ADMIN — GUAIFENESIN SCH MG: 200 SOLUTION ORAL at 05:00

## 2018-03-19 RX ADMIN — GUAIFENESIN SCH MG: 200 SOLUTION ORAL at 11:35

## 2018-03-19 RX ADMIN — FENTANYL CITRATE SCH MLS: 50 INJECTION, SOLUTION INTRAMUSCULAR; INTRAVENOUS at 23:12

## 2018-03-19 RX ADMIN — INSULIN LISPRO PRN UNIT: 100 INJECTION, SOLUTION INTRAVENOUS; SUBCUTANEOUS at 11:35

## 2018-03-19 RX ADMIN — INSULIN LISPRO PRN UNIT: 100 INJECTION, SOLUTION INTRAVENOUS; SUBCUTANEOUS at 04:53

## 2018-03-19 RX ADMIN — AMIODARONE HYDROCHLORIDE SCH MLS: 50 INJECTION, SOLUTION INTRAVENOUS at 21:31

## 2018-03-19 RX ADMIN — CEFEPIME HYDROCHLORIDE SCH MLS: 2 INJECTION, POWDER, FOR SOLUTION INTRAVENOUS at 05:00

## 2018-03-19 NOTE — PRG
DATE OF SERVICE:  03/19/2018

 

He was agitated last night, biting on the endotracheal tube.  He was started on Diprivan.  

 

PHYSICAL EXAMINATION: 

VITAL SIGNS:  Sats are 94, blood pressure 102/62, temperature is 93.  His I's and O's 1484 in,  173 o
ut.

CHEST:  Chest revealed bilateral rhonchi and crackles, extensive.

CARDIAC:  Sinus tachycardia.

ABDOMEN:  Soft, no masses.

 

LABORATORY:  White count 13,000, H&H 9 and 34, platelet count 220, pO2 62, pCO2 47.68, on a rate of 1
0, 40%, 500, a PEEP of 8.  Electrolytes are normal.  

 

X-ray still shows bilateral pleural effusion. 

 

IMPRESSION:

1.  Bilateral bronchopneumonia, gram negative.

2.  Chronic obstructive pulmonary disease.

3.  Bronchogenic carcinoma.

4.  Severe deconditioning.

 

PLAN:  I am not sure he is going to be weanable.  He is still on his Cordarone, antibiotics, nebulize
r treatments, supportive care.  Family to make a decision about withdrawal of care, comfort care, etc
.

 

In the meantime, minimize sedation.  PT, and supportive care.

 

One-half hour critical care time.  

 

I will follow.

## 2018-03-19 NOTE — RAD
SINGLE VIEW OF THE CHEST:

 

COMPARISON: 

3/18/18.

 

HISTORY: 

Ventilated patient with respiratory failure.

 

FINDINGS: 

A single view of the chest shows an enlarged but stable cardiomediastinal silhouette.  The patient is
 status post sternotomy.  The lines and tubes are unchanged in position.  There is a stable infiltrat
e in the right lower lobe.

 

IMPRESSION: 

Stable exam.

 

POS: MUKUND

## 2018-03-19 NOTE — PDOC.CTH
Cardiology Progress Note





- Subjective


He remains intubated and sedated. Currently in sinus tachycardia.





- Objective


 Vital Signs











  Temp Pulse Resp BP Pulse Ox


 


 03/19/18 16:00    25 H  


 


 03/19/18 15:54  98.9 F    


 


 03/19/18 14:24   99   139/76 


 


 03/19/18 14:23   99  26 H   90 L


 


 03/19/18 14:00    22 H  


 


 03/19/18 12:00  98.4 F    


 


 03/19/18 11:47    24 H  


 


 03/19/18 11:22   105 H   136/81 


 


 03/19/18 10:00    25 H  


 


 03/19/18 08:00  98.7 F  81  31 H   96


 


 03/19/18 07:08   81   101/62 


 


 03/19/18 07:07   80  21 H   94 L


 


 03/19/18 06:00    29 H  








 











Admit Weight                   160 lb


 


Weight                         192 lb 0.362 oz














 











 03/18/18 03/19/18 03/20/18





 06:59 06:59 06:59


 


Intake Total 1484 3678.9 90


 


Output Total 1730 1125 300


 


Balance -246 2553.9 -210














- Physical Examination


General/Neuro: other: (sedated)


Neck: no JVD present


Lungs: unlabored respirations


Heart: RRR


Abdomen: NT/ND


Extremities: other: (no edema.)





- Telemetry


Telemetry Rhythm: S tach





- Labs


Result Diagrams: 


 03/19/18 04:45





 03/19/18 04:45


 Troponin/CKMB











CK-MB (CK-2)  0.8 ng/mL (0-6.6)   03/10/18  12:10    


 


Troponin I  0.018 ng/mL (< 0.028)   03/13/18  04:27    














- Assessment/Plan





1. Paroxysmal afib


2. Lung Ca.


3. Pneumonia


4. COPD





PLAN:


- Continue amiodarone drip 


- Continue supportive care. 


- Vent weaning per critical care.

## 2018-03-19 NOTE — PDOC.PN
- Subjective


Encounter Start Date: 03/19/18


Encounter Start Time: 14:02


Subjective: remains intubated.discussed w family.prognosis discussed


-: awakens to sound.writes on paper but not weanable off the vent





- Objective


Resuscitation Status: 


 











Resuscitation Status           FULL:Full Resuscitation














MAR Reviewed: Yes


Vital Signs & Weight: 


 Vital Signs (12 hours)











  Temp Pulse Resp BP Pulse Ox


 


 03/19/18 12:00  98.4 F    


 


 03/19/18 11:47    24 H  


 


 03/19/18 11:22   105 H   136/81 


 


 03/19/18 10:00    25 H  


 


 03/19/18 08:00  98.7 F  81  31 H   96


 


 03/19/18 07:08   81   101/62 


 


 03/19/18 07:07   80  21 H   94 L


 


 03/19/18 06:00    29 H  


 


 03/19/18 04:00  98.3 F   24 H  








 Weight











Admit Weight                   160 lb


 


Weight                         192 lb 0.362 oz











 Most Recent Monitor Data











Heart Rate from ECG            103


 


NIBP                           137/79


 


NIBP BP-Mean                   89


 


Respiration from ECG           29


 


SpO2                           95














I&O: 


 











 03/18/18 03/19/18 03/20/18





 06:59 06:59 06:59


 


Intake Total 1484 3678.9 60


 


Output Total 1730 1125 165


 


Balance -246 2553.9 -105











Result Diagrams: 


 03/20/18 04:10





 03/20/18 04:10


Additional Labs: 


 Accuchecks











  03/19/18 03/19/18 03/19/18





  11:35 08:27 04:46


 


POC Glucose  187 H  174 H  232 H














  03/18/18 03/18/18 03/18/18





  23:53 20:03 15:46


 


POC Glucose  173 H  179 H  174 H








Microbiology





03/12/18 08:45   Bronchial Washing   Respiratory Culture - Final


                              Pseudomonas aeruginosa


03/10/18 12:15   Venous blood - Right Hand   Blood Culture - Final


                              NO GROWTH IN 5 DAYS


03/10/18 12:10   Venous blood - Right Arm   Blood Culture - Final


                              NO GROWTH IN 5 DAYS











Phys Exam





- Physical Examination


intubated.restless ,wakes up to sounds


HEENT: PERRLA, moist MMs, sclera anicteric


Neck: no JVD


Respiratory: no wheezing


caorse breath sounds b/l


Cardiovascular: RRR, no significant murmur


Gastrointestinal: soft, no distention, positive bowel sounds


Musculoskeletal: pulses present, edema present


Neurological: moves all 4 limbs


Skin: no rash





Dx/Plan


(1) Acute on chronic respiratory failure with hypoxia


Code(s): J96.21 - ACUTE AND CHRONIC RESPIRATORY FAILURE WITH HYPOXIA   Status: 

Acute   





(2) Bronchopneumonia


Code(s): J18.0 - BRONCHOPNEUMONIA, UNSPECIFIED ORGANISM   Status: Acute   

Comment: Pseudomonas on BAL Cx   





(3) Atrial fibrillation with RVR


Code(s): I48.91 - UNSPECIFIED ATRIAL FIBRILLATION   Status: Acute   Comment: on 

Amiodarone drip.cardiology folllowing   





(4) COPD (chronic obstructive pulmonary disease)


Status: Acute   





(5) Adenocarcinoma of lung, stage 4


Code(s): C34.90 - MALIGNANT NEOPLASM OF UNSP PART OF UNSP BRONCHUS OR LUNG   

Status: Chronic   





(6) CAD (coronary artery disease)


Code(s): I25.10 - ATHSCL HEART DISEASE OF NATIVE CORONARY ARTERY W/O ANG PCTRS 

  Status: Chronic   


Qualifiers: 


 





(7) Diabetes type 2, controlled


Code(s): E11.9 - TYPE 2 DIABETES MELLITUS WITHOUT COMPLICATIONS   Status: 

Chronic   





(8) H/O malignant neoplasm of colon


Code(s): Z85.038 - PERSONAL HISTORY OF MALIGNANT NEOPLASM OF LARGE INTESTINE   

Status: Chronic   Comment: s/p Colectomy 2017   





(9) Hypertension


Code(s): I10 - ESSENTIAL (PRIMARY) HYPERTENSION   Status: Chronic   


Qualifiers: 


 





(10) Malignant pleural effusion


Code(s): J91.0 - MALIGNANT PLEURAL EFFUSION   Status: Chronic   





- Plan


plan discussed w/ family, DVT proph w/SCDs


family requesting DNR status per nurse & PCT/


-: Would like to discuss w Dr. Desai.[facility] care


-: cont current care for now.Supportive management


-: Poor long term prognosis w advanced Lung CA


-: cont ABx,nebs,Vent support.





* .








Review of Systems





- Review of Systems


Other: 





can not be obtained due to intubated status





- Medications/Allergies


Allergies/Adverse Reactions: 


 Allergies











Allergy/AdvReac Type Severity Reaction Status Date / Time


 


Iodinated Contrast- Oral and Allergy   Verified 02/07/18 23:36





IV Dye     


 


iodine Allergy   Verified 02/07/18 23:36











Medications: 


 Current Medications





Acetaminophen (Tylenol)  650 mg PO Q4H PRN


   PRN Reason: Headache/Fever or Pain


Al Hydroxide/Mg Hydroxide (Maalox)  30 ml PO Q6H PRN


   PRN Reason: Heartburn  or Indigestion


Albuterol/Ipratropium (Duoneb)  3 ml NEB [id number] [facility]


   Last Admin: 03/19/18 11:21 Dose:  3 ml


Artificial Tears (Tears Naturale)  0 drop EA EYE PRN PRN


   PRN Reason: Dry Eyes


Atorvastatin Calcium (Lipitor)  10 mg PO HS [facility]


   Last Admin: 03/18/18 20:03 Dose:  10 mg


Dextrose/Water (Dextrose 50%)  25 gm SLOW IVP PRN PRN


   PRN Reason: Hypoglycemia


Enoxaparin Sodium (Lovenox)  40 mg SC 0900 [facility]


   Last Admin: 03/19/18 08:11 Dose:  40 mg


Famotidine (Pepcid)  20 mg PER TUBE BID [facility]


   Last Admin: 03/19/18 08:11 Dose:  20 mg


Glucagon (Glucagon)  1 mg IM PRN PRN


   PRN Reason: Hypoglycemia


Guaifenesin (Robitussin Sf)  300 mg PER TUBE Q6HR [facility]


   Last Admin: 03/19/18 11:35 Dose:  300 mg


Hydralazine HCl (Apresoline)  10 mg SLOW IVP Q4H PRN


   PRN Reason: Systolic BP > 180


Dextrose/Water (D5w)  1,000 mls @ 0 mls/hr IV .Q0M PRN; As Directed


   PRN Reason: Hypoglycemia


Cefepime HCl 2 gm/ Syringe 2.5 (ml/ Sodium Chloride)  12.5 mls @ 150 mls/hr 

SLOW IVP 0600,1800 [facility]


   Last Admin: 03/19/18 05:00 Dose:  12.5 mls


Norepinephrine Bitartrate (Levophed)  250 mls @ 0 mls/hr IVPB INF PRN; Protocol

; Titrate


   PRN Reason: Blood Pressure


Amiodarone HCl 450 mg/Miscellaneous Medication 1 each/ Dextrose/Water  259 mls 

@ 0 mls/hr IVPB INF [facility]; As Directed


   PRN Reason: Protocol


   Last Admin: 03/19/18 04:46 Dose:  259 mls


Fentanyl Citrate 2,000 mcg/ (Sodium Chloride)  100 mls @ 0 mls/hr IV INF [facility]


   PRN Reason: As Directed


   Last Admin: 03/17/18 01:15 Dose:  100 mls


Fentanyl Citrate (Fentanyl Bolus)  250 mls @ 0 mls/hr IVPB PRN PRN; As Directed


   PRN Reason: Breakthrough pain


   Stop: 04/13/18 14:46


Sodium Chloride (Normal Saline 0.9%)  1,000 mls @ 75 mls/hr IV .[id number] [facility]


   Last Admin: 03/19/18 10:23 Dose:  1,000 mls


Potassium Chloride 40 meq/ (Sodium Chloride)  270 mls @ 135 mls/hr IVPB ASDIR 

PRN


   PRN Reason: FOR SERUM K+ 2.5 - 3.5


Potassium Chloride 40 meq/ (Device)  100 mls @ 50 mls/hr IVPB ASDIR PRN


   PRN Reason: FOR SERUM K+ 2.5 - 3.5


Magnesium Sulfate 1 gm/ Sodium (Chloride)  102 mls @ 102 mls/hr IV PRN PRN


   PRN Reason: MAG LEVEL 1.4 - 2.0


Magnesium Sulfate 2 gm/ Device  100 mls @ 100 mls/hr IVPB ASDIR PRN


   PRN Reason: MAGNESIUM < 1.4


Potassium Phosphate 9 mmol/ (Sodium Chloride)  103 mls @ 25.75 mls/hr IVPB 

ASDIR PRN


   PRN Reason: Phosphate 1.0-1.8


   Last Admin: 03/18/18 09:27 Dose:  103 mls


Potassium Phosphate 12 mmol/ (Sodium Chloride)  254 mls @ 63.5 mls/hr IV ASDIR 

PRN


   PRN Reason: Serum phosphate 0.5-0.9


Potassium Phosphate 15 mmol/ (Sodium Chloride)  255 mls @ 63.75 mls/hr IV ASDIR 

PRN


   PRN Reason: Serum Phos < 0.5


Insulin Human Lispro (Humalog)  0 units SC .MODERATE SLIDING SC PRN


   PRN Reason: Moderate Correctional Scale


   Last Admin: 03/19/18 11:35 Dose:  2 unit


Insulin Human Lispro (Humalog)  0 units SC .BEDTIME SLIDING SC PRN


   PRN Reason: Bedtime Correctional Scale


   Last Admin: 03/13/18 21:04 Dose:  2 unit


Loperamide HCl (Imodium)  2 mg PO PRN PRN


   PRN Reason: Diarrhea/Loose Stools


Lorazepam (Ativan)  2 mg SLOW IVP Q2H PRN


   PRN Reason: Anxiety to achieve Cortez 2-3


   Stop: 04/13/18 14:46


   Last Admin: 03/19/18 11:41 Dose:  2 mg


Magnesium Hydroxide (Milk Of Magnesium)  30 ml PO DAILYPRN PRN


   PRN Reason: Constipation


Magnesium Oxide (Magnesium Oxide)  400 mg PO BIDPRN PRN


   PRN Reason: FOR SERUM MAG 1.4 - 2.0


Magnesium Oxide (Magnesium Oxide)  800 mg PO PRN PRN


   PRN Reason: FOR SERUM MAG < 1.4


Metformin HCl (Glucophage)  500 mg PER TUBE BID-[facility]


   Last Admin: 03/19/18 08:11 Dose:  500 mg


Methylprednisolone Sodium Succinate (Solu-Medrol)  40 mg IVP BID [facility]


   Last Admin: 03/19/18 08:10 Dose:  40 mg


Metoclopramide HCl (Reglan)  10 mg IVP Q8HR [facility]


   Last Admin: 03/19/18 05:00 Dose:  10 mg


Mineral Oil/White Petrolatum (Eucerin Cream)  0 gm TOP BIDPRN PRN


   PRN Reason: Dry Skin


Miscellaneous Medication (Phos-Nak)  1 pkt PO TIDPRN PRN


   PRN Reason: FOR PHOS LEVEL 1.0 - 1.8


Miscellaneous Medication (Phos-Nak)  2 pkt PO TIDPRN PRN


   PRN Reason: FOR PHOS LEVEL 0.5 - 1.0


Morphine Sulfate (Morphine)  2 mg SLOW IVP Q2H PRN


   PRN Reason:  BREAKTHROUGH PAIN


   Stop: 04/13/18 15:00


Ccu Electrolyte (Replacement Protocol)  0 each FS PRN PRN


   PRN Reason: FOR ELECTROLYTE REPLACEMENT


Ondansetron HCl (Zofran Odt)  4 mg PO Q6H PRN


   PRN Reason: Nausea/Vomiting


Ondansetron HCl (Zofran)  4 mg IVP Q6H PRN


   PRN Reason: Nausea/Vomiting


Phenol (Chloraseptic Spray 180 Ml Bot)  0 ml PO PRN PRN


   PRN Reason: Sore Throat


Pioglitazone [facility] (Actos)  [facility]


   Last Admin: 03/19/18 08:10 Dose:  30 mg


Potassium Chloride (K-Dur)  40 meq PO ASDIR PRN


   PRN Reason: FOR SERUM K+  2.5 - 3.5


Potassium Chloride (Klor-Con)  40 meq PER TUBE ASDIR PRN


   PRN Reason: FOR SERUM K+ 2.5-3.5


Propofol (Diprivan)  1,000 mg IV INF PRN; Protocol


   PRN Reason: TO ACHIEVE CORTEZ SCORE 2-3


   Stop: 04/13/18 14:46


   Last Admin: 03/19/18 04:42 Dose:  1,000 mg


Senna (Senokot)  2 tab PO HSPRN PRN


   PRN Reason: Constipation


Sodium Chloride (Ocean Nasal Spray 0.65%)  0 ml EA NARE QIDPRN PRN


   PRN Reason: Nasal Congestion


Sodium Chloride (Flush - Normal Saline)  10 ml IVF Q12HR [facility]


   Last Admin: 03/19/18 08:11 Dose:  10 ml


Sodium Chloride (Flush - Normal Saline)  10 ml IVF PRN PRN


   PRN Reason: Saline Flush


   Last Admin: 03/18/18 09:28 Dose:  10 ml

## 2018-03-20 VITALS — DIASTOLIC BLOOD PRESSURE: 59 MMHG | SYSTOLIC BLOOD PRESSURE: 108 MMHG

## 2018-03-20 VITALS — TEMPERATURE: 98.7 F

## 2018-03-20 LAB
ANALYZER IN CARDIO: (no result)
ANION GAP SERPL CALC-SCNC: 12 MMOL/L (ref 10–20)
BASE EXCESS STD BLDA CALC-SCNC: -7.6 MEQ/L
BUN SERPL-MCNC: 42 MG/DL (ref 8.4–25.7)
CA-I BLDA-SCNC: 1.3 MMOL/L (ref 1.12–1.3)
CALCIUM SERPL-MCNC: 8.2 MG/DL (ref 7.8–10.44)
CHLORIDE SERPL-SCNC: 108 MMOL/L (ref 98–107)
CO2 SERPL-SCNC: 20 MMOL/L (ref 23–31)
CREAT CL PREDICTED SERPL C-G-VRATE: 80 ML/MIN (ref 70–130)
GLUCOSE SERPL-MCNC: 216 MG/DL (ref 83–110)
HCO3 BLDA-SCNC: 20.9 MEQ/L (ref 22–26)
HCT VFR BLDA CALC: 39.5 % (ref 42–52)
HGB BLD-MCNC: 12.1 G/DL (ref 14–18)
HGB BLDA-MCNC: 12.1 G/DL (ref 14–18)
MCH RBC QN AUTO: 34.3 PG (ref 27–31)
MCV RBC AUTO: 103 FL (ref 80–94)
MDIFF COMPLETE?: YES
PCO2 BLDA: 55 MMHG (ref 35–45)
PH BLDA: 7.19 [PH] (ref 7.35–7.45)
PLATELET # BLD AUTO: 236 THOU/UL (ref 130–400)
PO2 BLDA: 54.3 MMHG (ref 80–100)
POTASSIUM SERPL-SCNC: 5.5 MMOL/L (ref 3.5–5.1)
RBC # BLD AUTO: 3.54 MILL/UL (ref 4.7–6.1)
SODIUM SERPL-SCNC: 134 MMOL/L (ref 136–145)
SPECIMEN DRAWN FROM PATIENT: (no result)
WBC # BLD AUTO: 14.2 THOU/UL (ref 4.8–10.8)

## 2018-03-20 RX ADMIN — CEFEPIME HYDROCHLORIDE SCH MLS: 2 INJECTION, POWDER, FOR SOLUTION INTRAVENOUS at 05:28

## 2018-03-20 RX ADMIN — INSULIN LISPRO PRN UNIT: 100 INJECTION, SOLUTION INTRAVENOUS; SUBCUTANEOUS at 05:25

## 2018-03-20 RX ADMIN — Medication SCH ML: at 08:35

## 2018-03-20 RX ADMIN — GUAIFENESIN SCH MG: 200 SOLUTION ORAL at 00:20

## 2018-03-20 RX ADMIN — GUAIFENESIN SCH MG: 200 SOLUTION ORAL at 05:28

## 2018-03-20 NOTE — PRG
DATE OF SERVICE:  03/20/2018

 

SUBJECTIVE:  Mr. Santana is unresponsive currently.  The family has decided they wish comfort care 
only.  They wish to have him removed from the ventilator.

 

PHYSICAL EXAMINATION: 

VITAL SIGNS:  Blood pressure 104/64, pulse in the high 90s, it is regular.

LUNGS:  Clear.

CARDIAC:  Tachycardic for rest.

ABDOMEN:  Soft and nontender.

 

ASSESSMENT:

1.  Metastatic cancer.

2.  Coronary artery disease.

3.  Paroxysmal atrial fibrillation.

 

PLAN:  The patient's family has decided they wish comfort care only.  The plan is to extubate today a
nd do not resuscitate.

## 2018-03-20 NOTE — PRG
DATE OF SERVICE:  03/20/2018 

 

SERVICE:  Pulmonary Medicine.

 

INTERVAL HISTORY:  The patient's family was under the impression that they were 
going to be meeting with Dr. Desai and withdrawing of her  first thing 
in the morning.  Nobody relayed this directly to Dr. Desai unfortunately.  As 
such, he was not available at this exact moment.  The family became a little 
upset and requested someone else to write an order to extubate to comfort 
measures.  It is my understanding that the family has had multiple 
conversations over the course of the past couple of weeks about palliative 
care.  Ultimately, the decision was made yesterday to transition to comfort 
care today and the family is ready at this moment to make that transition.  I 
put him on spontaneous breathing trial briefly.  On a spontaneous breathing 
trial, he pulled very poor volumes and developed hypoxemia fairly quickly.  As 
such, it is my suspicion that the patient is going to be passing away fairly 
quickly.  He was previously lucid until this morning.  He got a little bit 
agitated and ended up getting a couple doses of medications.  Currently, he is 
breathing comfortably on the ventilator and did not have severe agitation 
during his brief spontaneous breathing trial.  I reaffirmed with the entire 
family with the plan was to transition over to comfort care only, to pull the 
tube out of the patient, and to keep him comfortable with medications if they 
are necessary to prevent him from having shortness of breath, anxiety, or 
struggling.  They are aware that he will be passing away today or over the next 
couple of days depending on how long he continued to breathe on for.  I have 
discussed this with the patient's wife and multiple family members who are 
present at bedside.  As such, we will make that transition to comfort care at 
this time.

 

PHYSICAL EXAMINATION:

VITAL SIGNS:  Afebrile, pulse 99, blood pressure 115/66, respirations 36, 
saturation 92% on 40% FiO2 and a PEEP of 5.

HEENT:  Normocephalic, atraumatic.  Sclerae are white.  Conjunctivae pink.  
Oral and nasal mucosa is moist and without lesions.

LUNGS:  Decent air entry.  Crackles are present.  There is a prolonged 
expiratory phase, but I do not appreciate wheezing or rhonchi.

HEART:  Normal rate, regular.

ABDOMEN:  Soft, nontender, nondistended.  Bowel sounds are positive.

MUSCULOSKELETAL:  No cyanosis or clubbing.  There is no pitting in the 
bilateral lower extremities.

NEUROLOGIC:  Grossly nonfocal.

 

LABORATORY DATA:  WBC 14.2, hemoglobin 12.1, platelets 236,000.  PH 7.19, pCO2 
of 55, pO2 of 54.  Creatinine 0.97 and stable.  Potassium 5.5.  Basic metabolic 
profile is otherwise essentially stable or unremarkable.  Urinalysis is 
negative.  Bronchial washings from the 12th were growing Pseudomonas.  Blood 
cultures x2 are unremarkable.

 

IMAGING:  Chest x-ray demonstrates endotracheal tube is in good position.  
Enteric tube courses below the level of the diaphragm and out of the field of 
view.  Bilateral layering pleural effusions are present as well as bilateral 
airspace opacifications and prominent interstitial markings, consistent with 
possible volume overload.  Cephalization is also noted.  Previous sternotomy is 
present.  Multiple overlying wires are identified.

 

ASSESSMENT:

1.  Acute hypoxic respiratory failure.

2.  Healthcare-associated pneumonia, secondary to Pseudomonas.

3.  Widely metastatic non-small cell lung cancer.

 

PLAN:  Per patient's family decision, we will be transitioning over to comfort 
care today.  All medications will be discontinued.  All laboratories and 
studies will be discontinued.  We will put him on morphine and Ativan every 15 
minutes if he needs it for comfort measures only.  We will proceed with 
extubation when the family is ready.  Death is expected during this hospital 
stay.  Critical Care will continue to follow.  I discussed the plan with Dr. Desai, and he is ok with proceeding with this plan.

 

Critical care time:  30 minutes.  

 

MTDD

## 2018-03-20 NOTE — EKG
Test Reason : STAT

Blood Pressure : ***/*** mmHG

Vent. Rate : 152 BPM     Atrial Rate : 086 BPM

   P-R Int : 000 ms          QRS Dur : 094 ms

    QT Int : 286 ms       P-R-T Axes : 000 -19 222 degrees

   QTc Int : 454 ms

 

Atrial fibrillation with rapid ventricular response

Anterior infarct , age undetermined

Marked ST abnormality, possible inferior subendocardial injury

Abnormal ECG

When compared with ECG of 11-MAR-2018 18:19, (Unconfirmed)

Significant changes have occurred

Confirmed by LOS DIEHL (2) on 3/20/2018 7:40:46 PM

 

Referred By:             Confirmed By:LOS DIEHL

## 2018-03-20 NOTE — DS
DATE OF ADMISSION:  03/10/2018

 

DATE OF DEATH:  03/20/2018

 

CAUSE OF DEATH:

1.  Acute hypoxic respiratory failure.

2.  Bronchogenic pneumonia.

3.  Advanced metastatic adenocarcinoma of the lung.

 

SECONDARY DIAGNOSES AT THE TIME OF DEATH:

1.  Atrial fibrillation with rapid ventricular response.

2.  Chronic obstructive pulmonary disease.

3.  Coronary artery disease.

4.  Diabetes mellitus, type 2.

5.  History of colon cancer.

6.  Hypertension.

7.  Malignant pleural effusion.

 

CONSULTATIONS:  In-house,

1.  Pulmonary Critical Care, Dr. Kovacs and Dr. Manrique.

2.  Cardiology, Dr. Oreilly.

 

PROCEDURES DONE IN THE HOSPITAL:  Include,

1.  Multiple chest x-rays.

2.  Fiberoptic intubation and bronchoscopy on 03/12/2018.

3.  Extubation and subsequent reintubation on 03/15/2018.

 

HISTORY OF PRESENT ILLNESS:  Mr. Santana was a very pleasant 77-year-old  male with known 
history of metastatic lung cancer with malignant pleural effusion, who was recently discharged from [facility] facility in February after having a PleurX catheter placed for persistent malignant left pleural e
ffusion by Dr. Reyes.  He came back to the emergency room on 03/10/2018 with complaints of worsening 
shortness of breath despite using home oxygen.  He initially required CPAP upon presentation and was 
admitted to [facility] and Pulmonary Medicine was consulted.  It was thought that is acute hypoxic respirat
ory failure secondary to COPD exacerbation.  There was also suspicion of pneumonia, as he had leukocy
tosis and chest x-ray findings suggestive of same, and he was started on empiric IV antibiotics.  He 
was also somewhat hypotensive upon presentation.  Please see admission history and physical for [facility]
er details.

 

HOSPITAL COURSE:  Pulmonary and Cardiology were consulted.  Dr. Desai saw the patient the next day up
on his admission.  The patient unfortunately got worse, requiring intubation.  The bronchoalveolar la
vage during bronchoscopy was done at the time of intubation.  This came back positive for Pseudomonas
.  He was attempted to be extubated, but developed hypoxia and was reintubated.  After that, he remai
yenny un-weanable.

 

He was also seen by Cardiology, as he developed rapid atrial fibrillation given his history of atrial
 fibrillation.  He was started on amiodarone drip, which was continued.

 

The patient unfortunately did not improve with any intervention.  Ventilatory support was continued u
ntil the family decided this was not in line with the patient's wishes as expressed earlier.  Multipl
e family discussions were done between the family and Palliative Care and Pulmonary Medicine team and
 myself.  Eventually, the family decided for comfort care only.  He was made a DNR, and earlier this 
morning, he was terminally extubated.  He passed away shortly afterwards.  He was seen by Dr. Manrique
 prior to extubation when the decision was made.

 

I discussed his care after his death with his family, who have no further questions for me at this ti
me.  LISE pronounced the patient at 10:47 a.m.

## 2018-03-20 NOTE — RAD
CHEST 1 VIEW:

 

Date:  03/20/18 

 

HISTORY:  

Ventilated patient. 

 

COMPARISON:  

Chest radiograph from prior day. 

 

FINDINGS:

The patient is intubated with endotracheal tube tip in similar position to the prior examination, wit
h tip below the clavicles, although poorly seen. Enteric tube is in place with tip below diaphragm, b
ut out of field of view. 

 

Layering bilateral effusions. There are multiple air space opacities throughout both lungs. 

 

Prominent interstitial lung marking. Pulmonary metastasis and adenopathy.

 

IMPRESSION: 

Similar examination of the chest. 

 

 

POS: [facility]

## 2018-03-20 NOTE — EKG
Test Reason : STAT

Blood Pressure : ***/*** mmHG

Vent. Rate : 088 BPM     Atrial Rate : 088 BPM

   P-R Int : 000 ms          QRS Dur : 110 ms

    QT Int : 342 ms       P-R-T Axes : 046 -08 173 degrees

   QTc Int : 413 ms

 

Normal sinus rhythm

Low voltage QRS



Abnormal ECG

When compared with ECG of 07-FEB-2018 19:49,

No significant change was found

Confirmed by LOS DIEHL (2) on 3/20/2018 7:39:17 PM

 

Referred By:             Confirmed By:LOS DIEHL

## 2018-03-22 NOTE — PQF
SERENE ROMANO SALIM NOORJIBHAI MD

[id number]                                                             [facility]-
B10

[id number]                             

                                   

CLINICAL DOCUMENTATION CLARIFICATION FORM:  POST DISCHARGE



Addendum to original discharge summary date:  __________________________________
____



Late entry note date:  _________________________________________________________
__











DATE: 3/22/2018                        ATTN:  DR. PRO



Please exercise your independent, professional judgment in responding to the 
clarification form. 

Clinical indicators are provided on the bottom of this form for your review



Please check appropriate box(es):

 [ x ] Sepsis due to: (Pna, UTI, gangrenous gall bladder, etc.) _GNR pneumonia__
________________________

                 Due to:  [  ] Device (please specify) _________________________
_____________  

         [  ] Implant   [  ] Graft   [  ] Infusion      

[  ] SIRS due to non-infectious process (please specify etiology) ______________
___________

[ x ] with organ dysfunction     [  ] without organ dysfunction

[ x ] Severe sepsis with acute organ dysfunction of: _acute respiratory failure_
_________________________________

        (Examples: respiratory failure, encephalopathy, acute kidney failure, 
other)

[  ] Septic Shock

[  ] Localized infection without sepsis

[  ] Other diagnosis ___________

[  ] Unable to determine



In addition, please specify:

Present on Admission (POA):  [ x ] Yes             [  ] No             [  ] 
Unable to determine



For continuity of documentation, please document condition throughout progress 
notes and discharge summary.  Thank You.



CLINICAL INDICATORS - SIGNS / SYMPTOMS / LABS:



VITALS: BP: 90/63, PULSE:63, RR:26, TEMP:97.7,  O2 SAT 61% ON ROOM AIR & 100% 
ON CPAP



H&P:  TACHYPNEIC, HYPOTENSIVE

          SUSPECTED PNEUMONIA



3-19 PULSE 105; RESP 31, WBC 13

3-20 PULSE 103; RESP 21, WBC 14.2



PN 3-19 - BILATERAL BRONCHOPNEUMONIA - GRAM NEGATIVE





RISK FACTORS:

BRONCHOPNEUMONIA

A/C RESPIRATORY FAILURE WITH HYPOXIA





TREATMENTS:

VENT

COMFORT CARE

ANTIBIOTICS









(This form is maintained as a part of the permanent medical record)

2015 [employer].  All Rights Reserved

Adelita Vargas, SHALINI, [email or web address]    [email or web address]    
[phone]

                                                              



GREGORY